# Patient Record
Sex: FEMALE | Race: WHITE | Employment: UNEMPLOYED | URBAN - METROPOLITAN AREA
[De-identification: names, ages, dates, MRNs, and addresses within clinical notes are randomized per-mention and may not be internally consistent; named-entity substitution may affect disease eponyms.]

---

## 2019-02-28 ENCOUNTER — HOSPITAL ENCOUNTER (OUTPATIENT)
Dept: ULTRASOUND IMAGING | Facility: HOSPITAL | Age: 24
Discharge: HOME | End: 2019-02-28
Attending: OBSTETRICS & GYNECOLOGY
Payer: MEDICAID

## 2019-02-28 ENCOUNTER — OFFICE VISIT (OUTPATIENT)
Dept: OBGYN CLINIC | Facility: CLINIC | Age: 24
End: 2019-02-28
Payer: MEDICAID

## 2019-02-28 VITALS
WEIGHT: 165 LBS | BODY MASS INDEX: 34.63 KG/M2 | DIASTOLIC BLOOD PRESSURE: 62 MMHG | HEIGHT: 58 IN | SYSTOLIC BLOOD PRESSURE: 104 MMHG

## 2019-02-28 DIAGNOSIS — N92.6 MISSED MENSES: ICD-10-CM

## 2019-02-28 DIAGNOSIS — N92.6 MISSED MENSES: Primary | ICD-10-CM

## 2019-02-28 PROBLEM — Z98.891 H/O CESAREAN SECTION: Status: ACTIVE | Noted: 2019-02-28

## 2019-02-28 LAB
CONTROL LINE PRESENT WITH A CLEAR BACKGROUND (YES/NO): YES YES/NO
KIT LOT #: NORMAL NUMERIC

## 2019-02-28 PROCEDURE — 99215 OFFICE O/P EST HI 40 MIN: CPT | Performed by: OBSTETRICS & GYNECOLOGY

## 2019-02-28 PROCEDURE — 81025 URINE PREGNANCY TEST: CPT | Performed by: OBSTETRICS & GYNECOLOGY

## 2019-02-28 PROCEDURE — 76801 OB US < 14 WKS SINGLE FETUS: CPT | Performed by: OBSTETRICS & GYNECOLOGY

## 2019-02-28 RX ORDER — PNV 119/IRON FUM/FOLIC ACID 29 MG-1 MG
TABLET ORAL
COMMUNITY
Start: 2013-09-26

## 2019-02-28 NOTE — PROGRESS NOTES
HPI:    Patient ID: Telford Bernheim is a 26year old female. Patient here for PCV. Discussed PNC and PNV. Optional and required screening discussed. Patient with H/O previous  and thinking about VTOL.   Patient was in ER at another hospital abo

## 2019-03-07 ENCOUNTER — LAB ENCOUNTER (OUTPATIENT)
Dept: LAB | Facility: HOSPITAL | Age: 24
End: 2019-03-07
Attending: OBSTETRICS & GYNECOLOGY
Payer: MEDICAID

## 2019-03-07 ENCOUNTER — NURSE ONLY (OUTPATIENT)
Dept: OBGYN CLINIC | Facility: CLINIC | Age: 24
End: 2019-03-07
Payer: MEDICAID

## 2019-03-07 DIAGNOSIS — Z34.81 ENCOUNTER FOR SUPERVISION OF OTHER NORMAL PREGNANCY IN FIRST TRIMESTER: ICD-10-CM

## 2019-03-07 DIAGNOSIS — Z34.81 ENCOUNTER FOR SUPERVISION OF OTHER NORMAL PREGNANCY IN FIRST TRIMESTER: Primary | ICD-10-CM

## 2019-03-07 LAB
ANTIBODY SCREEN: NEGATIVE
BACTERIA UR QL AUTO: NEGATIVE /HPF
BASOPHILS # BLD AUTO: 0.01 X10(3) UL (ref 0–0.2)
BASOPHILS NFR BLD AUTO: 0.1 %
BILIRUB UR QL: NEGATIVE
CLARITY UR: CLEAR
COLOR UR: YELLOW
DEPRECATED RDW RBC AUTO: 42.9 FL (ref 35.1–46.3)
EOSINOPHIL # BLD AUTO: 0.11 X10(3) UL (ref 0–0.7)
EOSINOPHIL NFR BLD AUTO: 1.1 %
ERYTHROCYTE [DISTWIDTH] IN BLOOD BY AUTOMATED COUNT: 17.6 % (ref 11–15)
GLUCOSE 1H P GLC SERPL-MCNC: 128 MG/DL
GLUCOSE UR-MCNC: NEGATIVE MG/DL
HCT VFR BLD AUTO: 38.7 % (ref 35–48)
HGB BLD-MCNC: 13.2 G/DL (ref 12–16)
HGB UR QL STRIP.AUTO: NEGATIVE
IMM GRANULOCYTES # BLD AUTO: 0.04 X10(3) UL (ref 0–1)
IMM GRANULOCYTES NFR BLD: 0.4 %
KETONES UR-MCNC: NEGATIVE MG/DL
LEUKOCYTE ESTERASE UR QL STRIP.AUTO: NEGATIVE
LYMPHOCYTES # BLD AUTO: 1.7 X10(3) UL (ref 1–4)
LYMPHOCYTES NFR BLD AUTO: 16.6 %
MCH RBC QN AUTO: 28.7 PG (ref 26–34)
MCHC RBC AUTO-ENTMCNC: 34.1 G/DL (ref 31–37)
MCV RBC AUTO: 84.1 FL (ref 80–100)
MONOCYTES # BLD AUTO: 0.45 X10(3) UL (ref 0.1–1)
MONOCYTES NFR BLD AUTO: 4.4 %
NEUTROPHILS # BLD AUTO: 7.92 X10 (3) UL (ref 1.5–7.7)
NEUTROPHILS # BLD AUTO: 7.92 X10(3) UL (ref 1.5–7.7)
NEUTROPHILS NFR BLD AUTO: 77.4 %
NITRITE UR QL STRIP.AUTO: NEGATIVE
PH UR: 7 [PH] (ref 5–8)
PLATELET # BLD AUTO: 265 10(3)UL (ref 150–450)
PROT UR-MCNC: NEGATIVE MG/DL
RBC # BLD AUTO: 4.6 X10(6)UL (ref 3.8–5.3)
RBC #/AREA URNS AUTO: 1 /HPF
RH BLOOD TYPE: POSITIVE
SP GR UR STRIP: 1.02 (ref 1–1.03)
UROBILINOGEN UR STRIP-ACNC: <2
VIT C UR-MCNC: NEGATIVE MG/DL
WBC # BLD AUTO: 10.2 X10(3) UL (ref 4–11)
WBC #/AREA URNS AUTO: <1 /HPF

## 2019-03-07 PROCEDURE — 81003 URINALYSIS AUTO W/O SCOPE: CPT

## 2019-03-07 PROCEDURE — 87389 HIV-1 AG W/HIV-1&-2 AB AG IA: CPT

## 2019-03-07 PROCEDURE — 87340 HEPATITIS B SURFACE AG IA: CPT

## 2019-03-07 PROCEDURE — 87086 URINE CULTURE/COLONY COUNT: CPT

## 2019-03-07 PROCEDURE — 36415 COLL VENOUS BLD VENIPUNCTURE: CPT

## 2019-03-07 PROCEDURE — 87147 CULTURE TYPE IMMUNOLOGIC: CPT

## 2019-03-07 PROCEDURE — 99211 OFF/OP EST MAY X REQ PHY/QHP: CPT | Performed by: OBSTETRICS & GYNECOLOGY

## 2019-03-07 PROCEDURE — 85025 COMPLETE CBC W/AUTO DIFF WBC: CPT

## 2019-03-07 PROCEDURE — 82950 GLUCOSE TEST: CPT

## 2019-03-07 PROCEDURE — 86900 BLOOD TYPING SEROLOGIC ABO: CPT

## 2019-03-07 PROCEDURE — 86850 RBC ANTIBODY SCREEN: CPT

## 2019-03-07 PROCEDURE — 86762 RUBELLA ANTIBODY: CPT

## 2019-03-07 PROCEDURE — 86780 TREPONEMA PALLIDUM: CPT

## 2019-03-07 PROCEDURE — 86901 BLOOD TYPING SEROLOGIC RH(D): CPT

## 2019-03-08 LAB
HBV SURFACE AG SER-ACNC: <0.1 [IU]/L
HBV SURFACE AG SERPL QL IA: NONREACTIVE
RUBV IGG SER QL: POSITIVE
RUBV IGG SER-ACNC: 70 IU/ML (ref 10–?)
T PALLIDUM AB SER QL: NEGATIVE

## 2019-03-11 ENCOUNTER — TELEPHONE (OUTPATIENT)
Dept: OBGYN CLINIC | Facility: CLINIC | Age: 24
End: 2019-03-11

## 2019-03-11 DIAGNOSIS — B95.1 POSITIVE GBS TEST: Primary | ICD-10-CM

## 2019-03-11 PROBLEM — R82.71 GBS BACTERIURIA: Status: ACTIVE | Noted: 2019-03-11

## 2019-03-11 RX ORDER — NITROFURANTOIN 25; 75 MG/1; MG/1
100 CAPSULE ORAL 2 TIMES DAILY
Qty: 14 CAPSULE | Refills: 0 | Status: SHIPPED | OUTPATIENT
Start: 2019-03-11 | End: 2019-03-18

## 2019-03-11 NOTE — TELEPHONE ENCOUNTER
Informed pt of message below. Pt voices understanding to this information. Alvaro Resendez 103 entered into Epic. Gbs in urine added to problem--    --- Message from Kvng Moeller MD sent at 3/8/2019  3:40 PM CST -----  Urine Culture is + for GBBS.   Patient is al

## 2019-03-18 ENCOUNTER — ROUTINE PRENATAL (OUTPATIENT)
Dept: OBGYN CLINIC | Facility: CLINIC | Age: 24
End: 2019-03-18
Payer: MEDICAID

## 2019-03-18 ENCOUNTER — TELEPHONE (OUTPATIENT)
Dept: OBGYN CLINIC | Facility: CLINIC | Age: 24
End: 2019-03-18

## 2019-03-18 VITALS
DIASTOLIC BLOOD PRESSURE: 88 MMHG | BODY MASS INDEX: 34 KG/M2 | HEART RATE: 121 BPM | SYSTOLIC BLOOD PRESSURE: 131 MMHG | WEIGHT: 164.81 LBS

## 2019-03-18 DIAGNOSIS — Z34.82 ENCOUNTER FOR SUPERVISION OF OTHER NORMAL PREGNANCY IN SECOND TRIMESTER: Primary | ICD-10-CM

## 2019-03-18 DIAGNOSIS — N89.8 VAGINAL DISCHARGE: ICD-10-CM

## 2019-03-18 DIAGNOSIS — R00.0 TACHYCARDIA: ICD-10-CM

## 2019-03-18 PROBLEM — Z34.90 SUPERVISION OF NORMAL PREGNANCY: Status: ACTIVE | Noted: 2019-03-18

## 2019-03-18 PROBLEM — Z34.90 SUPERVISION OF NORMAL PREGNANCY (HCC): Status: ACTIVE | Noted: 2019-03-18

## 2019-03-18 LAB
APPEARANCE: CLEAR
MULTISTIX LOT#: NORMAL NUMERIC
PH, URINE: 6 (ref 4.5–8)
SPECIFIC GRAVITY: 1.02 (ref 1–1.03)
URINE-COLOR: YELLOW
UROBILINOGEN,SEMI-QN: 0.2 MG/DL (ref 0–1.9)

## 2019-03-18 PROCEDURE — 81002 URINALYSIS NONAUTO W/O SCOPE: CPT | Performed by: OBSTETRICS & GYNECOLOGY

## 2019-03-18 PROCEDURE — 0500F INITIAL PRENATAL CARE VISIT: CPT | Performed by: OBSTETRICS & GYNECOLOGY

## 2019-03-18 NOTE — PROGRESS NOTES
No complaints. Denies cramping or vaginal bleeding. Interested in first trimester screen but has not checked with her \"insurance\". Counseled that she can have a second trimester quad screen. Has GBS bacteriuria.   Is leaning towards a repeat

## 2019-03-19 ENCOUNTER — APPOINTMENT (OUTPATIENT)
Dept: LAB | Age: 24
End: 2019-03-19
Attending: PHYSICIAN ASSISTANT
Payer: MEDICAID

## 2019-03-19 ENCOUNTER — OFFICE VISIT (OUTPATIENT)
Dept: FAMILY MEDICINE CLINIC | Facility: CLINIC | Age: 24
End: 2019-03-19
Payer: MEDICAID

## 2019-03-19 VITALS
TEMPERATURE: 98 F | RESPIRATION RATE: 20 BRPM | WEIGHT: 164.13 LBS | HEIGHT: 58 IN | BODY MASS INDEX: 34.45 KG/M2 | SYSTOLIC BLOOD PRESSURE: 133 MMHG | DIASTOLIC BLOOD PRESSURE: 85 MMHG | HEART RATE: 120 BPM

## 2019-03-19 DIAGNOSIS — R00.0 TACHYCARDIA: ICD-10-CM

## 2019-03-19 DIAGNOSIS — R00.0 TACHYCARDIA: Primary | ICD-10-CM

## 2019-03-19 LAB
ANION GAP SERPL CALC-SCNC: 9 MMOL/L (ref 0–18)
BUN BLD-MCNC: 4 MG/DL (ref 7–18)
BUN/CREAT SERPL: 6.9 (ref 10–20)
C TRACH DNA SPEC QL NAA+PROBE: NEGATIVE
CALCIUM BLD-MCNC: 9 MG/DL (ref 8.5–10.1)
CHLORIDE SERPL-SCNC: 108 MMOL/L (ref 98–107)
CO2 SERPL-SCNC: 21 MMOL/L (ref 21–32)
CREAT BLD-MCNC: 0.58 MG/DL (ref 0.55–1.02)
GLUCOSE BLD-MCNC: 115 MG/DL (ref 70–99)
N GONORRHOEA DNA SPEC QL NAA+PROBE: NEGATIVE
OSMOLALITY SERPL CALC.SUM OF ELEC: 284 MOSM/KG (ref 275–295)
POTASSIUM SERPL-SCNC: 3.6 MMOL/L (ref 3.5–5.1)
SODIUM SERPL-SCNC: 138 MMOL/L (ref 136–145)
TSI SER-ACNC: 1.71 MIU/ML (ref 0.36–3.74)

## 2019-03-19 PROCEDURE — 99202 OFFICE O/P NEW SF 15 MIN: CPT | Performed by: FAMILY MEDICINE

## 2019-03-19 PROCEDURE — 99212 OFFICE O/P EST SF 10 MIN: CPT | Performed by: FAMILY MEDICINE

## 2019-03-19 PROCEDURE — 80048 BASIC METABOLIC PNL TOTAL CA: CPT

## 2019-03-19 PROCEDURE — 93010 ELECTROCARDIOGRAM REPORT: CPT | Performed by: PHYSICIAN ASSISTANT

## 2019-03-19 PROCEDURE — 93005 ELECTROCARDIOGRAM TRACING: CPT

## 2019-03-19 PROCEDURE — 36415 COLL VENOUS BLD VENIPUNCTURE: CPT

## 2019-03-19 PROCEDURE — 84443 ASSAY THYROID STIM HORMONE: CPT

## 2019-03-19 NOTE — PROGRESS NOTES
HPI:     HPI  26year-old pregnant female is here in the office due to elevated heart rate since yesterday at [facility] visit. Patient states that she feels dizziness sometimes when she wakes up. Patient admits that she does not drink enough fluids.  Patient denie file        Minutes per session: Not on file      Stress: Not on file    Relationships      Social connections:        Talks on phone: Not on file        Gets together: Not on file        Attends Worship service: Not on file        Active member of club tenderness. Neurological: She is alert and oriented to person, place, and time. She has normal reflexes. Skin: Skin is intact. No rash noted. Psychiatric: She has a normal mood and affect. HR recheck : 120.     Assessment and Plan[de-identified]     Problem List

## 2019-03-21 LAB
GENITAL VAGINOSIS SCREEN: NEGATIVE
TRICHOMONAS SCREEN: NEGATIVE

## 2019-03-22 ENCOUNTER — TELEPHONE (OUTPATIENT)
Dept: FAMILY MEDICINE CLINIC | Facility: CLINIC | Age: 24
End: 2019-03-22

## 2019-03-22 ENCOUNTER — OFFICE VISIT (OUTPATIENT)
Dept: FAMILY MEDICINE CLINIC | Facility: CLINIC | Age: 24
End: 2019-03-22
Payer: MEDICAID

## 2019-03-22 VITALS
SYSTOLIC BLOOD PRESSURE: 116 MMHG | HEART RATE: 116 BPM | DIASTOLIC BLOOD PRESSURE: 74 MMHG | BODY MASS INDEX: 34 KG/M2 | WEIGHT: 163 LBS

## 2019-03-22 DIAGNOSIS — R00.0 TACHYCARDIA: Primary | ICD-10-CM

## 2019-03-22 PROCEDURE — 99213 OFFICE O/P EST LOW 20 MIN: CPT | Performed by: PHYSICIAN ASSISTANT

## 2019-03-22 PROCEDURE — 99212 OFFICE O/P EST SF 10 MIN: CPT | Performed by: PHYSICIAN ASSISTANT

## 2019-03-22 NOTE — TELEPHONE ENCOUNTER
Pt will receive call from [address] [place] [address] cardiology Monday or Tuesday to schedule appt.  Progress note and ekg have already been faxed to [phone]

## 2019-03-23 ENCOUNTER — TELEPHONE (OUTPATIENT)
Dept: OBGYN CLINIC | Facility: CLINIC | Age: 24
End: 2019-03-23

## 2019-03-23 NOTE — ASSESSMENT & PLAN NOTE
Discussed lab results and EKG with patient. Order: Echocardiogram. Refer to Cardiologist for evaluation.

## 2019-03-23 NOTE — TELEPHONE ENCOUNTER
----- Message from Marilou Brunner, MD sent at 3/22/2019 12:53 PM CDT -----  Please inform patient that the cells on her Pap test were abnormal.  Recommend colposcopy, a closer examination of the cervix with magnification without biopsy as she is pregnant.

## 2019-03-23 NOTE — PROGRESS NOTES
HPI:     HPI  26year-old female is here for lab results and follow-up tachycardia. Patient states that she feels dizziness sometimes. Patient denies of chest pain, SOB, N/V/C/D, fever, syncope. There are no other concerns today.     Medications:       Curr phone: Not on file        Gets together: Not on file        Attends Anabaptist service: Not on file        Active member of club or organization: Not on file        Attends meetings of clubs or organizations: Not on file        Relationship status: Not on f Assessment and Plan[de-identified]     Problem List Items Addressed This Visit        Cardiovascular    Tachycardia - Primary     Discussed lab results and EKG with patient. Order: Echocardiogram. Refer to Cardiologist for evaluation.          Relevant Orders    C

## 2019-03-23 NOTE — TELEPHONE ENCOUNTER
Informed pt of message below- Pt voices understanding and appointment scheduled for 04/03/19 at 2:20 pm with ajb.      ----- Message from Bernadette Feliciano MD sent at 3/22/2019 12:53 PM CDT -----  Please inform patient that the cells on her Pap test were ab

## 2019-03-25 NOTE — TELEPHONE ENCOUNTER
S/w Wang Moura she thought she had an appointment for 4/1 with Dr. Rosas Oreilly at [facility]. Advised they called us to schedule her here. scheduled appt w/Dr. Rosas Oreilly for 4/8/19 at [address] [facility] w/[facility] phone room that there is no appt.  Scheduled at [facility] on

## 2019-03-25 NOTE — TELEPHONE ENCOUNTER
Patient advised of notes below. Patient has an appointment scheduled with cardiologist at [id number] [place] [address] on 4/1/19. Need labs, EKG faxed to [phone].

## 2019-03-25 NOTE — TELEPHONE ENCOUNTER
Pt Called stating someone called from the number giving directions to call cardiology at [facility]. Called to verify with [facility] and per [facility] they need to call and schedule with cardiologist here at the clinic.  Per Ekaterina Wilcox RN at [place] will call pt to prasanthu

## 2019-03-26 NOTE — TELEPHONE ENCOUNTER
To clarify my earlier note, Jus Lobato has an appointment on [facility] on 4/8/19. We have acces to [facility], no need to fax.

## 2019-03-29 ENCOUNTER — HOSPITAL ENCOUNTER (EMERGENCY)
Facility: HOSPITAL | Age: 24
Discharge: HOME | End: 2019-03-30
Attending: EMERGENCY MEDICINE
Payer: MEDICAID

## 2019-03-29 ENCOUNTER — TELEPHONE (OUTPATIENT)
Dept: OBGYN CLINIC | Facility: CLINIC | Age: 24
End: 2019-03-29

## 2019-03-29 ENCOUNTER — APPOINTMENT (OUTPATIENT)
Dept: GENERAL RADIOLOGY | Facility: HOSPITAL | Age: 24
End: 2019-03-29
Attending: EMERGENCY MEDICINE
Payer: MEDICAID

## 2019-03-29 DIAGNOSIS — S93.401A MILD SPRAIN OF RIGHT ANKLE, INITIAL ENCOUNTER: Primary | ICD-10-CM

## 2019-03-29 PROCEDURE — 80048 BASIC METABOLIC PNL TOTAL CA: CPT | Performed by: EMERGENCY MEDICINE

## 2019-03-29 PROCEDURE — 85379 FIBRIN DEGRADATION QUANT: CPT

## 2019-03-29 PROCEDURE — 73610 X-RAY EXAM OF ANKLE: CPT | Performed by: EMERGENCY MEDICINE

## 2019-03-29 PROCEDURE — 93010 ELECTROCARDIOGRAM REPORT: CPT | Performed by: EMERGENCY MEDICINE

## 2019-03-29 PROCEDURE — 85379 FIBRIN DEGRADATION QUANT: CPT | Performed by: EMERGENCY MEDICINE

## 2019-03-29 PROCEDURE — 84484 ASSAY OF TROPONIN QUANT: CPT

## 2019-03-29 PROCEDURE — 84484 ASSAY OF TROPONIN QUANT: CPT | Performed by: EMERGENCY MEDICINE

## 2019-03-29 PROCEDURE — 36415 COLL VENOUS BLD VENIPUNCTURE: CPT

## 2019-03-29 PROCEDURE — 99285 EMERGENCY DEPT VISIT HI MDM: CPT

## 2019-03-29 PROCEDURE — 80048 BASIC METABOLIC PNL TOTAL CA: CPT

## 2019-03-29 PROCEDURE — 93005 ELECTROCARDIOGRAM TRACING: CPT

## 2019-03-29 PROCEDURE — 85025 COMPLETE CBC W/AUTO DIFF WBC: CPT

## 2019-03-29 PROCEDURE — 85025 COMPLETE CBC W/AUTO DIFF WBC: CPT | Performed by: EMERGENCY MEDICINE

## 2019-03-29 RX ORDER — ACETAMINOPHEN 500 MG
1000 TABLET ORAL ONCE
Status: COMPLETED | OUTPATIENT
Start: 2019-03-29 | End: 2019-03-29

## 2019-03-29 NOTE — TELEPHONE ENCOUNTER
Per pt she slipped and sprained her ankle. Pt wondering if safe to be taking tylenol extra strength every 4 hrs.  Please advise pt is 14w1d

## 2019-03-29 NOTE — TELEPHONE ENCOUNTER
Pt 14w1d reported she took a wrong step when she twisted her ankle. Asked how much Tylenol she can take. Pt advised per office Ob med guidelines she can take 500 mg-1,00mg of Tylenol. Not to exceed 3,000mg in 24 hours. Advised to go to urgent care to have her ankle looked at. Pt verbalized understanding.

## 2019-03-30 ENCOUNTER — APPOINTMENT (OUTPATIENT)
Dept: CT IMAGING | Facility: HOSPITAL | Age: 24
End: 2019-03-30
Attending: EMERGENCY MEDICINE
Payer: MEDICAID

## 2019-03-30 VITALS
TEMPERATURE: 98 F | DIASTOLIC BLOOD PRESSURE: 79 MMHG | HEART RATE: 125 BPM | RESPIRATION RATE: 16 BRPM | SYSTOLIC BLOOD PRESSURE: 115 MMHG | HEIGHT: 58 IN | WEIGHT: 160 LBS | OXYGEN SATURATION: 100 % | BODY MASS INDEX: 33.58 KG/M2

## 2019-03-30 PROCEDURE — 71260 CT THORAX DX C+: CPT | Performed by: EMERGENCY MEDICINE

## 2019-03-30 PROCEDURE — 81003 URINALYSIS AUTO W/O SCOPE: CPT | Performed by: EMERGENCY MEDICINE

## 2019-03-30 NOTE — ED PROVIDER NOTES
Patient Seen in: [facility] AND [facility] Emergency Department    History   Patient presents with:  Lower Extremity Injury (musculoskeletal)  Chest Pain Angina (cardiovascular)    Stated Complaint: Swollen ankle; chest pain    HPI    59-year-old female now G2 P Exam    Constitutional: Oriented to person, place, and time. Appears well-developed. No distress. Head: Normocephalic and atraumatic. Eyes: Conjunctivae are normal. Pupils are equal, round, and reactive to light. Neck: Normal range of motion.  Neck s Status                     ---------                               -----------         ------                     CBC W/ DIFFERENTIAL[[id number]]          Abnormal            Final result                 Please view results for these tests on the in [facility]erica Humphrey, [address], [address]  [id number] [facility] [id number]    Call in 2 days      YOUR OB DOCTOR'S OFFICE    Call in 2 days          Medications Prescribed:  Current Discharge Medication List

## 2019-03-30 NOTE — ED INITIAL ASSESSMENT (HPI)
Pt c/o right ankle pain and swelling started 24 hours ago after she miss a step and twisted her ankle, pt also c/o chest pain, denies nausea/vomiting/SOB, pt sts that she is 13 weeks pregnant

## 2019-04-03 ENCOUNTER — ROUTINE PRENATAL (OUTPATIENT)
Dept: OBGYN CLINIC | Facility: CLINIC | Age: 24
End: 2019-04-03
Payer: MEDICAID

## 2019-04-03 ENCOUNTER — APPOINTMENT (OUTPATIENT)
Dept: LAB | Facility: HOSPITAL | Age: 24
End: 2019-04-03
Attending: OBSTETRICS & GYNECOLOGY
Payer: MEDICAID

## 2019-04-03 ENCOUNTER — OFFICE VISIT (OUTPATIENT)
Dept: OBGYN CLINIC | Facility: CLINIC | Age: 24
End: 2019-04-03
Payer: MEDICAID

## 2019-04-03 VITALS — BODY MASS INDEX: 34 KG/M2 | WEIGHT: 164 LBS

## 2019-04-03 VITALS
HEART RATE: 106 BPM | DIASTOLIC BLOOD PRESSURE: 80 MMHG | BODY MASS INDEX: 34 KG/M2 | WEIGHT: 164 LBS | SYSTOLIC BLOOD PRESSURE: 131 MMHG

## 2019-04-03 DIAGNOSIS — Z34.81 ENCOUNTER FOR SUPERVISION OF OTHER NORMAL PREGNANCY IN FIRST TRIMESTER: Primary | ICD-10-CM

## 2019-04-03 DIAGNOSIS — R87.612 PAPANICOLAOU SMEAR OF CERVIX WITH LOW GRADE SQUAMOUS INTRAEPITHELIAL LESION (LGSIL): Primary | ICD-10-CM

## 2019-04-03 DIAGNOSIS — O36.8390 MATERNAL CARE FOR FETAL TACHYCARDIA DURING PREGNANCY: ICD-10-CM

## 2019-04-03 PROBLEM — T81.40XD POSTOPERATIVE INFECTION, SUBSEQUENT ENCOUNTER: Status: ACTIVE | Noted: 2019-04-03

## 2019-04-03 PROBLEM — N89.8 VAGINAL DISCHARGE: Status: RESOLVED | Noted: 2019-03-18 | Resolved: 2019-04-03

## 2019-04-03 PROBLEM — R03.0 ELEVATED BP WITHOUT DIAGNOSIS OF HYPERTENSION: Status: ACTIVE | Noted: 2019-04-03

## 2019-04-03 PROCEDURE — 36415 COLL VENOUS BLD VENIPUNCTURE: CPT

## 2019-04-03 PROCEDURE — 80053 COMPREHEN METABOLIC PANEL: CPT

## 2019-04-03 PROCEDURE — 0502F SUBSEQUENT PRENATAL CARE: CPT | Performed by: OBSTETRICS & GYNECOLOGY

## 2019-04-03 PROCEDURE — 57420 EXAM OF VAGINA W/SCOPE: CPT | Performed by: OBSTETRICS & GYNECOLOGY

## 2019-04-03 PROCEDURE — 81002 URINALYSIS NONAUTO W/O SCOPE: CPT | Performed by: OBSTETRICS & GYNECOLOGY

## 2019-04-03 NOTE — PROGRESS NOTES
Patient saw general medicine for initial evaluation for maternal tachycardia. Thyroid screen and EKG negative. Has a cardiology consult this Monday. Patient notes at times her heart racing. She denies shortness of breath or chest pain.   She twisted her

## 2019-04-03 NOTE — PROGRESS NOTES
Patient counseled on abnormal Pap tests. This is her first abnormality. Counseled on findings on her colposcopy. Clinically ROSANNE 1. Plan Pap and colp 6 weeks postpartum.

## 2019-04-05 ENCOUNTER — TELEPHONE (OUTPATIENT)
Dept: OBGYN CLINIC | Facility: CLINIC | Age: 24
End: 2019-04-05

## 2019-04-05 ENCOUNTER — APPOINTMENT (OUTPATIENT)
Dept: LAB | Facility: HOSPITAL | Age: 24
End: 2019-04-05
Attending: OBSTETRICS & GYNECOLOGY
Payer: MEDICAID

## 2019-04-05 DIAGNOSIS — O36.8390 MATERNAL CARE FOR FETAL TACHYCARDIA DURING PREGNANCY: ICD-10-CM

## 2019-04-05 PROCEDURE — 84156 ASSAY OF PROTEIN URINE: CPT

## 2019-04-05 NOTE — TELEPHONE ENCOUNTER
PER PT STATE SHE WAS SUPPOSE TO DROP OFF URINE TODAY / PT STATE SHE CAN'T BECAUSE SHE DO NOT HAVE A RIDE / PT WANT TO KNOW IF SHE COULD DROP IT OFF TOMORROW MORNING / PLS ADV

## 2019-04-05 NOTE — TELEPHONE ENCOUNTER
Called reference lab to verify. Per Yusra Galloway, pt needs to keep it refrigerated and she can drop it off tomorrow. Pt informed and verbalized understanding.

## 2019-04-23 ENCOUNTER — HOSPITAL ENCOUNTER (EMERGENCY)
Facility: HOSPITAL | Age: 24
Discharge: HOME | End: 2019-04-23
Attending: PHYSICIAN ASSISTANT
Payer: MEDICAID

## 2019-04-23 ENCOUNTER — APPOINTMENT (OUTPATIENT)
Dept: ULTRASOUND IMAGING | Facility: HOSPITAL | Age: 24
End: 2019-04-23
Attending: PHYSICIAN ASSISTANT
Payer: MEDICAID

## 2019-04-23 ENCOUNTER — TELEPHONE (OUTPATIENT)
Dept: OBGYN CLINIC | Facility: CLINIC | Age: 24
End: 2019-04-23

## 2019-04-23 VITALS
DIASTOLIC BLOOD PRESSURE: 58 MMHG | BODY MASS INDEX: 33.58 KG/M2 | WEIGHT: 160 LBS | HEIGHT: 58 IN | HEART RATE: 76 BPM | RESPIRATION RATE: 16 BRPM | TEMPERATURE: 98 F | SYSTOLIC BLOOD PRESSURE: 108 MMHG | OXYGEN SATURATION: 97 %

## 2019-04-23 DIAGNOSIS — O21.9 NAUSEA AND VOMITING IN PREGNANCY: Primary | ICD-10-CM

## 2019-04-23 DIAGNOSIS — R42 LIGHTHEADEDNESS: ICD-10-CM

## 2019-04-23 DIAGNOSIS — Z86.2 HISTORY OF ANEMIA: ICD-10-CM

## 2019-04-23 DIAGNOSIS — R19.7 DIARRHEA, UNSPECIFIED TYPE: ICD-10-CM

## 2019-04-23 PROCEDURE — 81025 URINE PREGNANCY TEST: CPT

## 2019-04-23 PROCEDURE — 93005 ELECTROCARDIOGRAM TRACING: CPT

## 2019-04-23 PROCEDURE — 83690 ASSAY OF LIPASE: CPT | Performed by: PHYSICIAN ASSISTANT

## 2019-04-23 PROCEDURE — 96361 HYDRATE IV INFUSION ADD-ON: CPT

## 2019-04-23 PROCEDURE — 96374 THER/PROPH/DIAG INJ IV PUSH: CPT

## 2019-04-23 PROCEDURE — 85025 COMPLETE CBC W/AUTO DIFF WBC: CPT | Performed by: PHYSICIAN ASSISTANT

## 2019-04-23 PROCEDURE — 93010 ELECTROCARDIOGRAM REPORT: CPT | Performed by: PHYSICIAN ASSISTANT

## 2019-04-23 PROCEDURE — S0028 INJECTION, FAMOTIDINE, 20 MG: HCPCS | Performed by: PHYSICIAN ASSISTANT

## 2019-04-23 PROCEDURE — 76705 ECHO EXAM OF ABDOMEN: CPT | Performed by: PHYSICIAN ASSISTANT

## 2019-04-23 PROCEDURE — 76815 OB US LIMITED FETUS(S): CPT | Performed by: PHYSICIAN ASSISTANT

## 2019-04-23 PROCEDURE — 81001 URINALYSIS AUTO W/SCOPE: CPT | Performed by: PHYSICIAN ASSISTANT

## 2019-04-23 PROCEDURE — 99285 EMERGENCY DEPT VISIT HI MDM: CPT

## 2019-04-23 PROCEDURE — 80048 BASIC METABOLIC PNL TOTAL CA: CPT | Performed by: PHYSICIAN ASSISTANT

## 2019-04-23 PROCEDURE — 80076 HEPATIC FUNCTION PANEL: CPT | Performed by: PHYSICIAN ASSISTANT

## 2019-04-23 PROCEDURE — 96375 TX/PRO/DX INJ NEW DRUG ADDON: CPT

## 2019-04-23 RX ORDER — ONDANSETRON 4 MG/1
4 TABLET, ORALLY DISINTEGRATING ORAL EVERY 6 HOURS PRN
Qty: 10 TABLET | Refills: 0 | Status: SHIPPED | OUTPATIENT
Start: 2019-04-23 | End: 2019-04-26

## 2019-04-23 RX ORDER — ONDANSETRON 2 MG/ML
4 INJECTION INTRAMUSCULAR; INTRAVENOUS ONCE
Status: COMPLETED | OUTPATIENT
Start: 2019-04-23 | End: 2019-04-23

## 2019-04-23 RX ORDER — DEXTROSE AND SODIUM CHLORIDE 5; .9 G/100ML; G/100ML
INJECTION, SOLUTION INTRAVENOUS ONCE
Status: COMPLETED | OUTPATIENT
Start: 2019-04-23 | End: 2019-04-23

## 2019-04-23 RX ORDER — FAMOTIDINE 10 MG/ML
20 INJECTION, SOLUTION INTRAVENOUS ONCE
Status: COMPLETED | OUTPATIENT
Start: 2019-04-23 | End: 2019-04-23

## 2019-04-23 NOTE — ED PROVIDER NOTES
Patient Seen in: [facility] AND [facility] Emergency Department    History   Patient presents with:  Nausea/Vomiting/Diarrhea (gastrointestinal)    Stated Complaint: n/v 17 wks preg    HPI      71-year-old female, approximately 17 weeks pregnant presents with ch 1410 110   Resp 04/23/19 1410 16   Temp 04/23/19 1410 97.5 °F (36.4 °C)   Temp src 04/23/19 1410 Oral   SpO2 04/23/19 1410 95 %   O2 Device 04/23/19 1513 None (Room air)       Current:/68   Pulse 98   Temp 97.5 °F (36.4 °C) (Oral)   Resp 17   Ht 147. following components:       Result Value    Glucose 103 (*)     BUN 5 (*)     Creatinine 0.39 (*)     All other components within normal limits   URINALYSIS WITH CULTURE REFLEX - Abnormal; Notable for the following components:    Protein Urine 30  (*) Approved by (CST): Pennie Barajas MD on 4/23/2019 at 16:53          [employer] (RSE=10208)    Result Date: 4/23/2019  CONCLUSION:  1. Single viable appearing intrauterine gestation cephalic presentation. Placenta is anterior, and there is no previa.

## 2019-04-23 NOTE — ED INITIAL ASSESSMENT (HPI)
N/v/d onset last night. Denies abd pain. approx 17 weeks preg. Also states she has been spotting since last night.

## 2019-04-23 NOTE — TELEPHONE ENCOUNTER
OB History     T1    L1    SAB0  TAB1  Ectopic0  Multiple0  Live Births1     Pt is 17w5d and states that the past week she has been light headed. States that in the past three days she has been having some swelling also.  States that she has not

## 2019-05-03 ENCOUNTER — TELEPHONE (OUTPATIENT)
Dept: OBGYN CLINIC | Facility: CLINIC | Age: 24
End: 2019-05-03

## 2019-05-03 DIAGNOSIS — R00.0 TACHYCARDIA, UNSPECIFIED: Primary | ICD-10-CM

## 2019-05-03 DIAGNOSIS — Z34.82 ENCOUNTER FOR SUPERVISION OF OTHER NORMAL PREGNANCY IN SECOND TRIMESTER: ICD-10-CM

## 2019-05-03 NOTE — TELEPHONE ENCOUNTER
PA previously obtained see referral tab. Return call from [facility] clarifying that dx on level 1 indicates fetal tachycardia. RN to change to maternal tachycardia. Order updated to indicate change.

## 2019-05-03 NOTE — TELEPHONE ENCOUNTER
A level 1 u/s should be sufficient as the tachycardia is maternal not fetal.  She should have MFM consultation for maternal tachycardia.

## 2019-05-03 NOTE — TELEPHONE ENCOUNTER
RN placed call to [facility], advised Magnus Zambrano RN of B response. Magnus Zambrano verbalized understanding. Will advise [facility] staff.

## 2019-05-03 NOTE — TELEPHONE ENCOUNTER
Call received from [facility] RN asking that order for anatomy scan is changed to Level 2 due to tachycardia. RN routing to provider for consideration. Pt has [address] and will need prior auth.     RN routing to provider for consideration

## 2019-05-08 ENCOUNTER — ROUTINE PRENATAL (OUTPATIENT)
Dept: OBGYN CLINIC | Facility: CLINIC | Age: 24
End: 2019-05-08
Payer: MEDICAID

## 2019-05-08 VITALS — DIASTOLIC BLOOD PRESSURE: 77 MMHG | WEIGHT: 162 LBS | SYSTOLIC BLOOD PRESSURE: 112 MMHG | BODY MASS INDEX: 34 KG/M2

## 2019-05-08 DIAGNOSIS — Z34.02 ENCOUNTER FOR SUPERVISION OF NORMAL FIRST PREGNANCY IN SECOND TRIMESTER: Primary | ICD-10-CM

## 2019-05-08 PROCEDURE — 0502F SUBSEQUENT PRENATAL CARE: CPT | Performed by: OBSTETRICS & GYNECOLOGY

## 2019-05-08 PROCEDURE — 81002 URINALYSIS NONAUTO W/O SCOPE: CPT | Performed by: OBSTETRICS & GYNECOLOGY

## 2019-05-08 NOTE — PROGRESS NOTES
Pt has appt for ob2 u/s 1 week. Good fm noted. Pt stated she has had some joint discomfort rickie her knees prior to pregnancy, pt to call her pcp for this.

## 2019-05-09 ENCOUNTER — TELEPHONE (OUTPATIENT)
Dept: OBGYN CLINIC | Facility: CLINIC | Age: 24
End: 2019-05-09

## 2019-05-09 DIAGNOSIS — R00.0 TACHYCARDIA: Primary | ICD-10-CM

## 2019-05-09 NOTE — TELEPHONE ENCOUNTER
Jose R Aldana from [facility] called to get a level 2 for pt per Dr. Maritza Barbosa pt needs a level 2 for maternal tachycardia. I changed the order and will be calling [employer] for prior auth.

## 2019-05-09 NOTE — TELEPHONE ENCOUNTER
Deirdre from [facility] called to change orders to level 2. I did let her know about your previous note stating a level 1 would be sufficient for maternal tachycardia but Martínez Handy stated Dr. Rizwana Brown spoke to pt and pt wants a level 2. Pls advise.

## 2019-05-12 NOTE — PROGRESS NOTES
Outpatient Maternal-Fetal Medicine Consultation    Dear Dr. Pritesh Morris    Thank you for requesting ultrasound evaluation and maternal fetal medicine consultation on your patient Taryn Link.   As you are aware she is a 26year old female  with a survey. See PACS/Imaging Tab For Complete Ultrasound Report  I interpreted the results and reviewed them with the patient.     DISCUSSION  During her visit we discussed and reviewed the following issues:  [facility]  Cardiology workup essential was spent in review of records, consultation and coordination of care. Our discussion is summarized above. The approximate physician face-to-face time was 40 minutes.

## 2019-05-13 ENCOUNTER — HOSPITAL ENCOUNTER (OUTPATIENT)
Dept: PERINATAL CARE | Facility: HOSPITAL | Age: 24
Discharge: HOME | End: 2019-05-13
Attending: OBSTETRICS & GYNECOLOGY
Payer: MEDICAID

## 2019-05-13 VITALS
SYSTOLIC BLOOD PRESSURE: 126 MMHG | DIASTOLIC BLOOD PRESSURE: 72 MMHG | WEIGHT: 162 LBS | HEIGHT: 58 IN | HEART RATE: 114 BPM | BODY MASS INDEX: 34 KG/M2

## 2019-05-13 DIAGNOSIS — Z36.3 ENCOUNTER FOR ANTENATAL SCREENING FOR MALFORMATION USING ULTRASOUND: Primary | ICD-10-CM

## 2019-05-13 DIAGNOSIS — O36.8390 MATERNAL CARE FOR FETAL TACHYCARDIA DURING PREGNANCY: ICD-10-CM

## 2019-05-13 DIAGNOSIS — Z36.3 ENCOUNTER FOR ANTENATAL SCREENING FOR MALFORMATION USING ULTRASOUND: ICD-10-CM

## 2019-05-13 PROCEDURE — 99218 INITIAL OBSERVATION CARE,LEVL I: CPT | Performed by: OBSTETRICS & GYNECOLOGY

## 2019-05-13 PROCEDURE — 76811 OB US DETAILED SNGL FETUS: CPT | Performed by: OBSTETRICS & GYNECOLOGY

## 2019-05-20 ENCOUNTER — TELEPHONE (OUTPATIENT)
Dept: PERINATAL CARE | Facility: HOSPITAL | Age: 24
End: 2019-05-20

## 2019-05-23 ENCOUNTER — OFFICE VISIT (OUTPATIENT)
Dept: CARDIOLOGY CLINIC | Facility: CLINIC | Age: 24
End: 2019-05-23
Payer: MEDICAID

## 2019-05-23 VITALS
HEART RATE: 104 BPM | WEIGHT: 163 LBS | OXYGEN SATURATION: 94 % | DIASTOLIC BLOOD PRESSURE: 71 MMHG | SYSTOLIC BLOOD PRESSURE: 107 MMHG | RESPIRATION RATE: 22 BRPM | BODY MASS INDEX: 34 KG/M2

## 2019-05-23 DIAGNOSIS — R00.0 SINUS TACHYCARDIA: Primary | ICD-10-CM

## 2019-05-23 PROCEDURE — 99245 OFF/OP CONSLTJ NEW/EST HI 55: CPT | Performed by: INTERNAL MEDICINE

## 2019-05-23 PROCEDURE — 99212 OFFICE O/P EST SF 10 MIN: CPT | Performed by: INTERNAL MEDICINE

## 2019-05-23 NOTE — PROGRESS NOTES
[facility], [facility]    Cardiac Electrophysiology Consultation  2019    Name:  Merari Harrisbalbir  : 1995    Date of consultation:   2019    Referring physician: Dr. Catherine Hunt (Fetal-Maternal Medicine)    Reason for Consultation:  tach exertion  CARDIOVASCULAR: no chest pain  GI: denies abdominal pain and denies heartburn  : no dysuria or hematuria  NEURO: denies headaches, focal weaknesses or paresthesias    Physical Exam:  Vital Signs: /71 (BP Location: Right arm, Patient Posit 138 04/23/2019    K 3.5 04/23/2019     04/23/2019    CO2 21.0 04/23/2019     Lab Results   Component Value Date    TSH 1.710 03/19/2019       IMPRESSIONS:  1. Sinus tachycardia  (primary encounter diagnosis)   2.  Pregnancy    She has been noted to ha

## 2019-05-24 ENCOUNTER — TELEPHONE (OUTPATIENT)
Dept: CARDIOLOGY CLINIC | Facility: CLINIC | Age: 24
End: 2019-05-24

## 2019-05-24 ENCOUNTER — MED REC SCAN ONLY (OUTPATIENT)
Dept: CARDIOLOGY CLINIC | Facility: CLINIC | Age: 24
End: 2019-05-24

## 2019-05-24 NOTE — TELEPHONE ENCOUNTER
Per [employer] online PA obtained # [id number] good  5/24/19- 7/8/19. For Echo cpt - [id number]. notified Meryle Donning in managed care. notified pt.

## 2019-05-29 ENCOUNTER — MED REC SCAN ONLY (OUTPATIENT)
Dept: INTERNAL MEDICINE CLINIC | Facility: CLINIC | Age: 24
End: 2019-05-29

## 2019-06-04 ENCOUNTER — TELEPHONE (OUTPATIENT)
Dept: OBGYN CLINIC | Facility: CLINIC | Age: 24
End: 2019-06-04

## 2019-06-04 DIAGNOSIS — R00.0 TACHYCARDIA: Primary | ICD-10-CM

## 2019-06-06 NOTE — TELEPHONE ENCOUNTER
Authorization Number: PILAR  Case Number: [id number]  Status: Additional Information Required  Approval Date:   Service Code: [id number]    RN, clinicals needed for U/S above.

## 2019-06-10 ENCOUNTER — ROUTINE PRENATAL (OUTPATIENT)
Dept: OBGYN CLINIC | Facility: CLINIC | Age: 24
End: 2019-06-10
Payer: MEDICAID

## 2019-06-10 VITALS — SYSTOLIC BLOOD PRESSURE: 112 MMHG | WEIGHT: 167 LBS | BODY MASS INDEX: 35 KG/M2 | DIASTOLIC BLOOD PRESSURE: 78 MMHG

## 2019-06-10 DIAGNOSIS — Z34.82 ENCOUNTER FOR SUPERVISION OF OTHER NORMAL PREGNANCY IN SECOND TRIMESTER: Primary | ICD-10-CM

## 2019-06-10 PROCEDURE — 0502F SUBSEQUENT PRENATAL CARE: CPT | Performed by: OBSTETRICS & GYNECOLOGY

## 2019-06-10 PROCEDURE — 81002 URINALYSIS NONAUTO W/O SCOPE: CPT | Performed by: OBSTETRICS & GYNECOLOGY

## 2019-06-10 NOTE — PROGRESS NOTES
Per [facility] note, u/s at [address] for r/o cleft palate. Pt has appt jun 20.

## 2019-06-18 ENCOUNTER — APPOINTMENT (OUTPATIENT)
Dept: ULTRASOUND IMAGING | Facility: HOSPITAL | Age: 24
End: 2019-06-18
Attending: OBSTETRICS & GYNECOLOGY
Payer: MEDICAID

## 2019-06-18 ENCOUNTER — TELEPHONE (OUTPATIENT)
Dept: OBGYN CLINIC | Facility: CLINIC | Age: 24
End: 2019-06-18

## 2019-06-18 ENCOUNTER — HOSPITAL ENCOUNTER (OUTPATIENT)
Facility: HOSPITAL | Age: 24
Setting detail: OBSERVATION
Discharge: HOME | End: 2019-06-19
Attending: OBSTETRICS & GYNECOLOGY | Admitting: OBSTETRICS & GYNECOLOGY
Payer: MEDICAID

## 2019-06-18 ENCOUNTER — HOSPITAL ENCOUNTER (EMERGENCY)
Facility: HOSPITAL | Age: 24
Discharge: HOME | End: 2019-06-18
Attending: PHYSICIAN ASSISTANT
Payer: MEDICAID

## 2019-06-18 ENCOUNTER — APPOINTMENT (OUTPATIENT)
Dept: GENERAL RADIOLOGY | Facility: HOSPITAL | Age: 24
End: 2019-06-18
Attending: PHYSICIAN ASSISTANT
Payer: MEDICAID

## 2019-06-18 VITALS
SYSTOLIC BLOOD PRESSURE: 110 MMHG | RESPIRATION RATE: 20 BRPM | TEMPERATURE: 98 F | DIASTOLIC BLOOD PRESSURE: 68 MMHG | HEART RATE: 117 BPM

## 2019-06-18 VITALS
BODY MASS INDEX: 32.54 KG/M2 | TEMPERATURE: 98 F | DIASTOLIC BLOOD PRESSURE: 75 MMHG | HEIGHT: 58 IN | HEART RATE: 123 BPM | OXYGEN SATURATION: 97 % | SYSTOLIC BLOOD PRESSURE: 123 MMHG | RESPIRATION RATE: 20 BRPM | WEIGHT: 155 LBS
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
## 2019-06-18 DIAGNOSIS — IMO0001: ICD-10-CM

## 2019-06-18 PROBLEM — Z34.90 PREGNANCY: Status: ACTIVE | Noted: 2019-06-18

## 2019-06-18 PROBLEM — Z34.90 PREGNANCY (HCC): Status: ACTIVE | Noted: 2019-06-18

## 2019-06-18 PROCEDURE — 73140 X-RAY EXAM OF FINGER(S): CPT | Performed by: PHYSICIAN ASSISTANT

## 2019-06-18 PROCEDURE — 76815 OB US LIMITED FETUS(S): CPT | Performed by: OBSTETRICS & GYNECOLOGY

## 2019-06-18 PROCEDURE — 99283 EMERGENCY DEPT VISIT LOW MDM: CPT

## 2019-06-18 PROCEDURE — 76811 OB US DETAILED SNGL FETUS: CPT | Performed by: OBSTETRICS & GYNECOLOGY

## 2019-06-18 NOTE — ED INITIAL ASSESSMENT (HPI)
Pt reports slipping in the shower and hitting the right side of her abd. Pt is 25 weeks pregnant . Pt reports that she has felt the baby move since the fall.  Pt denies LOC

## 2019-06-18 NOTE — TELEPHONE ENCOUNTER
Pt 25 weeks c/o falling in the shower about 30 minutes ago. Per pt fell on right side including the side of her right abdomen.  Pt denied cramping pain, vaginal bleeding or leaking fluid, but stated abdomen feels harder than usual and reported feeling short

## 2019-06-18 NOTE — ED NOTES
Patient presents to ER after falling in the shower and landing on her right side. Patient is currently 25 weeks pregnant.  Denies vaginal bleeding or abdominal pain

## 2019-06-19 PROCEDURE — 59025 FETAL NON-STRESS TEST: CPT | Performed by: OBSTETRICS & GYNECOLOGY

## 2019-06-19 NOTE — TRIAGE
[facility]      Triage Note    [address] Patient Status:  Observation    1995 MRN [id number]   Location [address]  Attending Isacc Hackett MD   [facility] Day # 0 PCP Tanya Vaughan [place] NST   Variability: Moderate           Accelerations: Yes           Decelerations: None            Baseline: 145 BPM           Uterine Irritability: No           Contractions: Not present                       Acoustic Stimulator: No           Nonstre
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
## 2019-06-19 NOTE — ED PROVIDER NOTES
Patient Seen in: [facility] AND [facility] Emergency Department    History   Patient presents with:  Fall (musculoskeletal, neurologic)    Stated Complaint:     HPI    28-year-old female, 25 weeks pregnant presents with chief complaint of fall.   Onset 1 hour saida O2 Device None (Room air)       Current:/75   Pulse (!) 123   Temp 98 °F (36.7 °C) (Oral)   Resp 20   Ht 147.3 cm (4' 10\")   Wt 70.3 kg   LMP 12/25/2018   SpO2 97%   BMI 32.40 kg/m²      PULSE OX within normal limits on room air as interpreted by wounds. No compartment syndrome. No edema. Remainder of musculoskeletal system is grossly intact. There is no obvious deformity. Spine nontender to palpation. Neurological: Gross motor movement is intact in all 4 extremities.   Patient exhibits normal health screening including reassessment of your blood pressure.     Medications Prescribed:  Current Discharge Medication List

## 2019-06-24 ENCOUNTER — TELEPHONE (OUTPATIENT)
Dept: PERINATAL CARE | Facility: HOSPITAL | Age: 24
End: 2019-06-24

## 2019-06-24 NOTE — TELEPHONE ENCOUNTER
The patient reported to [facility] for follow-up regarding the potential for a cleft of the soft palate noted on her level 2 ultrasound. Fortunately, the follow-up ultrasound and fetal MRI were reassuring:  There was no evidence of cleft l

## 2019-07-09 ENCOUNTER — ROUTINE PRENATAL (OUTPATIENT)
Dept: OBGYN CLINIC | Facility: CLINIC | Age: 24
End: 2019-07-09
Payer: MEDICAID

## 2019-07-09 VITALS
HEART RATE: 118 BPM | SYSTOLIC BLOOD PRESSURE: 116 MMHG | BODY MASS INDEX: 35 KG/M2 | WEIGHT: 169 LBS | DIASTOLIC BLOOD PRESSURE: 78 MMHG

## 2019-07-09 DIAGNOSIS — O34.219 PREVIOUS CESAREAN DELIVERY, ANTEPARTUM CONDITION OR COMPLICATION: ICD-10-CM

## 2019-07-09 DIAGNOSIS — Z34.83 ENCOUNTER FOR SUPERVISION OF OTHER NORMAL PREGNANCY IN THIRD TRIMESTER: Primary | ICD-10-CM

## 2019-07-09 LAB
MULTISTIX LOT#: NORMAL NUMERIC
PH, URINE: 6 (ref 4.5–8)
SPECIFIC GRAVITY: 1.01 (ref 1–1.03)
URINE-COLOR: YELLOW
UROBILINOGEN,SEMI-QN: 0.2 MG/DL (ref 0–1.9)

## 2019-07-09 PROCEDURE — 81002 URINALYSIS NONAUTO W/O SCOPE: CPT | Performed by: OBSTETRICS & GYNECOLOGY

## 2019-07-09 PROCEDURE — 0502F SUBSEQUENT PRENATAL CARE: CPT | Performed by: OBSTETRICS & GYNECOLOGY

## 2019-07-09 NOTE — PROGRESS NOTES
[facility]  Obstetrics and Gynecology  Prenatal Visit  Wendi Oliver MD    HPI   Julia Hauser is a 26year old.o.  28w5d weeks. Here for routine prenatal visit and is without complaints.   Patient denies any regular uterine contracti given a consent to review and will decide at her next PN visit. Pt to f/u in 2 weeks with Vandana Quiros CNM, for [facility] education and tdap. Joanne Yin MD, MD  2:01 PM  7/9/2019

## 2019-07-18 ENCOUNTER — LAB ENCOUNTER (OUTPATIENT)
Dept: LAB | Age: 24
End: 2019-07-18
Attending: OBSTETRICS & GYNECOLOGY
Payer: MEDICAID

## 2019-07-18 DIAGNOSIS — Z34.82 ENCOUNTER FOR SUPERVISION OF OTHER NORMAL PREGNANCY IN SECOND TRIMESTER: ICD-10-CM

## 2019-07-18 LAB
BASOPHILS # BLD AUTO: 0.02 X10(3) UL (ref 0–0.2)
BASOPHILS NFR BLD AUTO: 0.2 %
DEPRECATED RDW RBC AUTO: 41.5 FL (ref 35.1–46.3)
EOSINOPHIL # BLD AUTO: 0.07 X10(3) UL (ref 0–0.7)
EOSINOPHIL NFR BLD AUTO: 0.8 %
ERYTHROCYTE [DISTWIDTH] IN BLOOD BY AUTOMATED COUNT: 15.2 % (ref 11–15)
GLUCOSE 1H P GLC SERPL-MCNC: 154 MG/DL
HCT VFR BLD AUTO: 33.4 % (ref 35–48)
HGB BLD-MCNC: 10.6 G/DL (ref 12–16)
IMM GRANULOCYTES # BLD AUTO: 0.05 X10(3) UL (ref 0–1)
IMM GRANULOCYTES NFR BLD: 0.5 %
LYMPHOCYTES # BLD AUTO: 1.24 X10(3) UL (ref 1–4)
LYMPHOCYTES NFR BLD AUTO: 13.6 %
MCH RBC QN AUTO: 24.4 PG (ref 26–34)
MCHC RBC AUTO-ENTMCNC: 31.7 G/DL (ref 31–37)
MCV RBC AUTO: 77 FL (ref 80–100)
MONOCYTES # BLD AUTO: 0.38 X10(3) UL (ref 0.1–1)
MONOCYTES NFR BLD AUTO: 4.2 %
NEUTROPHILS # BLD AUTO: 7.39 X10 (3) UL (ref 1.5–7.7)
NEUTROPHILS # BLD AUTO: 7.39 X10(3) UL (ref 1.5–7.7)
NEUTROPHILS NFR BLD AUTO: 80.7 %
PLATELET # BLD AUTO: 254 10(3)UL (ref 150–450)
RBC # BLD AUTO: 4.34 X10(6)UL (ref 3.8–5.3)
WBC # BLD AUTO: 9.2 X10(3) UL (ref 4–11)

## 2019-07-18 PROCEDURE — 36415 COLL VENOUS BLD VENIPUNCTURE: CPT

## 2019-07-18 PROCEDURE — 85025 COMPLETE CBC W/AUTO DIFF WBC: CPT

## 2019-07-18 PROCEDURE — 82950 GLUCOSE TEST: CPT

## 2019-07-22 ENCOUNTER — TELEPHONE (OUTPATIENT)
Dept: OBGYN CLINIC | Facility: CLINIC | Age: 24
End: 2019-07-22

## 2019-07-22 DIAGNOSIS — R73.09 ELEVATED GLUCOSE TOLERANCE TEST: Primary | ICD-10-CM

## 2019-07-22 RX ORDER — FERROUS SULFATE 325(65) MG
325 TABLET ORAL
Qty: 30 TABLET | Refills: 6 | Status: SHIPPED | OUTPATIENT
Start: 2019-07-22

## 2019-07-22 NOTE — TELEPHONE ENCOUNTER
Pt informed regarding 3 hr gtt and need to schedule that. She was also informed on Iron supplement. Pt requesting a prescription be sent to her pharmacy.  Pls advise

## 2019-07-22 NOTE — TELEPHONE ENCOUNTER
----- Message from Karl Hines MD sent at 7/21/2019 11:17 PM CDT -----  Abnormal 1 hr gtt,  Please inform and help pt schedule 3 hr gtt.      Mild anemia noted, please have pt start Iron once daily plus her daily PNV

## 2019-07-23 NOTE — TELEPHONE ENCOUNTER
If possible to send in a prescription then may send in Ferrous Sulfate 325 mg 1 tab po Daily #30 with 6 refills.

## 2019-07-24 ENCOUNTER — ROUTINE PRENATAL (OUTPATIENT)
Dept: OBGYN CLINIC | Facility: CLINIC | Age: 24
End: 2019-07-24
Payer: MEDICAID

## 2019-07-24 ENCOUNTER — LAB ENCOUNTER (OUTPATIENT)
Dept: LAB | Facility: HOSPITAL | Age: 24
End: 2019-07-24
Attending: OBSTETRICS & GYNECOLOGY
Payer: MEDICAID

## 2019-07-24 VITALS — WEIGHT: 170 LBS | DIASTOLIC BLOOD PRESSURE: 76 MMHG | BODY MASS INDEX: 36 KG/M2 | SYSTOLIC BLOOD PRESSURE: 114 MMHG

## 2019-07-24 DIAGNOSIS — R73.09 ELEVATED GLUCOSE TOLERANCE TEST: ICD-10-CM

## 2019-07-24 DIAGNOSIS — Z34.83 ENCOUNTER FOR SUPERVISION OF OTHER NORMAL PREGNANCY IN THIRD TRIMESTER: Primary | ICD-10-CM

## 2019-07-24 LAB
1 HR GLUCOSE GESTATIONAL: 156 MG/DL
GLUCOSE 1H P GLC SERPL-MCNC: 105 MG/DL
GLUCOSE 3H P GLC SERPL-MCNC: 132 MG/DL
GLUCOSE P FAST SERPL-MCNC: 86 MG/DL
MULTISTIX LOT#: NORMAL NUMERIC
PH, URINE: 6.5 (ref 4.5–8)
SPECIFIC GRAVITY: 1.02 (ref 1–1.03)
URINE-COLOR: YELLOW
UROBILINOGEN,SEMI-QN: 0.2 MG/DL (ref 0–1.9)

## 2019-07-24 PROCEDURE — 81002 URINALYSIS NONAUTO W/O SCOPE: CPT | Performed by: ADVANCED PRACTICE MIDWIFE

## 2019-07-24 PROCEDURE — 82951 GLUCOSE TOLERANCE TEST (GTT): CPT

## 2019-07-24 PROCEDURE — 82952 GTT-ADDED SAMPLES: CPT

## 2019-07-24 PROCEDURE — 36415 COLL VENOUS BLD VENIPUNCTURE: CPT

## 2019-07-24 PROCEDURE — 0502F SUBSEQUENT PRENATAL CARE: CPT | Performed by: ADVANCED PRACTICE MIDWIFE

## 2019-07-24 RX ORDER — BREAST PUMP
EACH MISCELLANEOUS
Qty: 1 EACH | Refills: 0 | Status: SHIPPED | OUTPATIENT
Start: 2019-07-24

## 2019-07-24 NOTE — PROGRESS NOTES
Benjamin Sethi is active, its a boy. Has headache frequently that are relieved with tylenol. EDPS 10. Has increased stress. Has nasal congestion and cold will give TDAP at next visit.   Was counseled regarding  will talk with Dr. Reyes Barba at next visit for h

## 2019-07-30 ENCOUNTER — TELEPHONE (OUTPATIENT)
Dept: OBGYN CLINIC | Facility: CLINIC | Age: 24
End: 2019-07-30

## 2019-07-30 NOTE — TELEPHONE ENCOUNTER
Per pt is having toothaches and states her dentist is requiring a clearance note. Please advise would like to  in ADO.  Please advise

## 2019-08-04 NOTE — PROGRESS NOTES
Liv Menchaca    Dear Dr. Iliana Escobar    Thank you for requesting an ultrasound and maternal-fetal medicine consultation on your patient Deion Tam.   As you are aware she is a 26year old female  with a boyle review of records, consultation and coordination of care. Our discussion is summarized above. The approximate physician face-to-face time was 15 minutes.

## 2019-08-05 ENCOUNTER — HOSPITAL ENCOUNTER (OUTPATIENT)
Dept: PERINATAL CARE | Facility: HOSPITAL | Age: 24
Discharge: HOME | End: 2019-08-05
Attending: OBSTETRICS & GYNECOLOGY
Payer: MEDICAID

## 2019-08-05 ENCOUNTER — TELEPHONE (OUTPATIENT)
Dept: OBGYN CLINIC | Facility: CLINIC | Age: 24
End: 2019-08-05

## 2019-08-05 VITALS
DIASTOLIC BLOOD PRESSURE: 70 MMHG | HEART RATE: 107 BPM | BODY MASS INDEX: 35.68 KG/M2 | SYSTOLIC BLOOD PRESSURE: 112 MMHG | HEIGHT: 58 IN | WEIGHT: 170 LBS

## 2019-08-05 DIAGNOSIS — R00.0 TACHYCARDIA: ICD-10-CM

## 2019-08-05 DIAGNOSIS — Z36.89 ENCOUNTER FOR ULTRASOUND TO ASSESS FETAL GROWTH: Primary | ICD-10-CM

## 2019-08-05 DIAGNOSIS — R03.0 ELEVATED BP WITHOUT DIAGNOSIS OF HYPERTENSION: ICD-10-CM

## 2019-08-05 DIAGNOSIS — Z36.89 ENCOUNTER FOR ULTRASOUND TO ASSESS FETAL GROWTH: ICD-10-CM

## 2019-08-05 PROCEDURE — 99213 OFFICE O/P EST LOW 20 MIN: CPT | Performed by: OBSTETRICS & GYNECOLOGY

## 2019-08-05 PROCEDURE — 76816 OB US FOLLOW-UP PER FETUS: CPT | Performed by: OBSTETRICS & GYNECOLOGY

## 2019-08-05 PROCEDURE — 76819 FETAL BIOPHYS PROFIL W/O NST: CPT | Performed by: OBSTETRICS & GYNECOLOGY

## 2019-08-12 ENCOUNTER — TELEPHONE (OUTPATIENT)
Dept: OBGYN CLINIC | Facility: CLINIC | Age: 24
End: 2019-08-12

## 2019-08-12 ENCOUNTER — ROUTINE PRENATAL (OUTPATIENT)
Dept: OBGYN CLINIC | Facility: CLINIC | Age: 24
End: 2019-08-12
Payer: MEDICAID

## 2019-08-12 VITALS
DIASTOLIC BLOOD PRESSURE: 78 MMHG | WEIGHT: 172 LBS | BODY MASS INDEX: 36 KG/M2 | HEART RATE: 109 BPM | SYSTOLIC BLOOD PRESSURE: 120 MMHG

## 2019-08-12 DIAGNOSIS — Z34.93 NORMAL PREGNANCY IN THIRD TRIMESTER: Primary | ICD-10-CM

## 2019-08-12 LAB
APPEARANCE: CLEAR
MULTISTIX LOT#: NORMAL NUMERIC
PH, URINE: 7 (ref 4.5–8)
SPECIFIC GRAVITY: 1.02 (ref 1–1.03)
URINE-COLOR: YELLOW
UROBILINOGEN,SEMI-QN: 0.2 MG/DL (ref 0–1.9)

## 2019-08-12 PROCEDURE — 0502F SUBSEQUENT PRENATAL CARE: CPT | Performed by: OBSTETRICS & GYNECOLOGY

## 2019-08-12 PROCEDURE — 81002 URINALYSIS NONAUTO W/O SCOPE: CPT | Performed by: OBSTETRICS & GYNECOLOGY

## 2019-08-12 NOTE — TELEPHONE ENCOUNTER
Mile Marte MD routed conversation to Em Ob/Gyne [facility] Surgery/Referral 1 minute ago (3:42 PM)      Mile Marte MD 1 minute ago (3:41 PM)         Pt requested repeat c/s at 39 weeks,  Schedule repeat  section 20 for doctor on c

## 2019-08-15 ENCOUNTER — LAB ENCOUNTER (OUTPATIENT)
Dept: LAB | Age: 24
End: 2019-08-15
Attending: OBSTETRICS & GYNECOLOGY
Payer: MEDICAID

## 2019-08-15 ENCOUNTER — TELEPHONE (OUTPATIENT)
Dept: OBGYN CLINIC | Facility: CLINIC | Age: 24
End: 2019-08-15

## 2019-08-15 DIAGNOSIS — Z34.93 NORMAL PREGNANCY IN THIRD TRIMESTER: ICD-10-CM

## 2019-08-15 LAB
BASOPHILS # BLD AUTO: 0.02 X10(3) UL (ref 0–0.2)
BASOPHILS NFR BLD AUTO: 0.2 %
DEPRECATED RDW RBC AUTO: 45 FL (ref 35.1–46.3)
EOSINOPHIL # BLD AUTO: 0.1 X10(3) UL (ref 0–0.7)
EOSINOPHIL NFR BLD AUTO: 1.1 %
ERYTHROCYTE [DISTWIDTH] IN BLOOD BY AUTOMATED COUNT: 16.7 % (ref 11–15)
HCT VFR BLD AUTO: 36.4 % (ref 35–48)
HGB BLD-MCNC: 11.5 G/DL (ref 12–16)
IMM GRANULOCYTES # BLD AUTO: 0.05 X10(3) UL (ref 0–1)
IMM GRANULOCYTES NFR BLD: 0.5 %
LYMPHOCYTES # BLD AUTO: 1.63 X10(3) UL (ref 1–4)
LYMPHOCYTES NFR BLD AUTO: 17.6 %
MCH RBC QN AUTO: 24.1 PG (ref 26–34)
MCHC RBC AUTO-ENTMCNC: 31.6 G/DL (ref 31–37)
MCV RBC AUTO: 76.2 FL (ref 80–100)
MONOCYTES # BLD AUTO: 0.46 X10(3) UL (ref 0.1–1)
MONOCYTES NFR BLD AUTO: 5 %
NEUTROPHILS # BLD AUTO: 7 X10 (3) UL (ref 1.5–7.7)
NEUTROPHILS # BLD AUTO: 7 X10(3) UL (ref 1.5–7.7)
NEUTROPHILS NFR BLD AUTO: 75.6 %
PLATELET # BLD AUTO: 229 10(3)UL (ref 150–450)
RBC # BLD AUTO: 4.78 X10(6)UL (ref 3.8–5.3)
WBC # BLD AUTO: 9.3 X10(3) UL (ref 4–11)

## 2019-08-15 PROCEDURE — 87389 HIV-1 AG W/HIV-1&-2 AB AG IA: CPT

## 2019-08-15 PROCEDURE — 86780 TREPONEMA PALLIDUM: CPT

## 2019-08-15 PROCEDURE — 36415 COLL VENOUS BLD VENIPUNCTURE: CPT

## 2019-08-15 PROCEDURE — 85025 COMPLETE CBC W/AUTO DIFF WBC: CPT

## 2019-08-15 NOTE — TELEPHONE ENCOUNTER
Pt dropped off physical from to be filled out. Rn informed that form needs to be filled out by PCP. Called pt and unable to leave message. If pt calls back, please inform pt she needs to  form or if she wants it faxed to her PCP, we need a fax number.

## 2019-08-16 ENCOUNTER — HOSPITAL ENCOUNTER (OUTPATIENT)
Facility: HOSPITAL | Age: 24
Setting detail: OBSERVATION
Discharge: HOME | End: 2019-08-16
Attending: OBSTETRICS & GYNECOLOGY | Admitting: OBSTETRICS & GYNECOLOGY
Payer: MEDICAID

## 2019-08-16 VITALS — OXYGEN SATURATION: 98 % | DIASTOLIC BLOOD PRESSURE: 55 MMHG | HEART RATE: 130 BPM | SYSTOLIC BLOOD PRESSURE: 105 MMHG

## 2019-08-16 LAB
AMPHET UR QL SCN: NEGATIVE
CANNABINOIDS UR QL SCN: NEGATIVE
COCAINE UR QL: NEGATIVE
MDMA UR QL SCN: NEGATIVE
OPIATES UR QL SCN: NEGATIVE
OXYCODONE UR QL SCN: NEGATIVE
PCP UR QL SCN: NEGATIVE
T PALLIDUM AB SER QL: NEGATIVE

## 2019-08-16 PROCEDURE — 59025 FETAL NON-STRESS TEST: CPT

## 2019-08-16 PROCEDURE — 99212 OFFICE O/P EST SF 10 MIN: CPT

## 2019-08-16 PROCEDURE — 80307 DRUG TEST PRSMV CHEM ANLYZR: CPT | Performed by: OBSTETRICS & GYNECOLOGY
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
## 2019-08-16 NOTE — TRIAGE
[facility]      Triage Note    [address] Patient Status:  Observation    1995 MRN [id number]   Location [address]  Attending Astrid Delacruz MD   Hosp Day # 0 PCP Grady Mckeon PA-C Acoustic Stimulator: No           Nonstress Test Interpretation: Reactive           Nonstress Test Second Interpretation: Reactive          FHR Category: Category I           Additional Comments       Reason for visit: Pt presented to ob

## 2019-08-20 NOTE — TELEPHONE ENCOUNTER
Emery Galeano confirmed Dr. Renetta Fountain will assist.   Informed Gill Price in [facility].

## 2019-08-27 ENCOUNTER — ROUTINE PRENATAL (OUTPATIENT)
Dept: OBGYN CLINIC | Facility: CLINIC | Age: 24
End: 2019-08-27
Payer: MEDICAID

## 2019-08-27 VITALS — BODY MASS INDEX: 36 KG/M2 | WEIGHT: 172 LBS | DIASTOLIC BLOOD PRESSURE: 76 MMHG | SYSTOLIC BLOOD PRESSURE: 111 MMHG

## 2019-08-27 DIAGNOSIS — Z34.83 ENCOUNTER FOR SUPERVISION OF OTHER NORMAL PREGNANCY IN THIRD TRIMESTER: Primary | ICD-10-CM

## 2019-08-27 PROBLEM — T81.40XD POSTOPERATIVE INFECTION, SUBSEQUENT ENCOUNTER: Status: RESOLVED | Noted: 2019-04-03 | Resolved: 2019-08-27

## 2019-08-27 PROBLEM — R62.52 SHORT STATURE: Status: ACTIVE | Noted: 2019-08-27

## 2019-08-27 LAB
APPEARANCE: CLEAR
MULTISTIX LOT#: NORMAL NUMERIC
PH, URINE: 6 (ref 4.5–8)
SPECIFIC GRAVITY: 1.01 (ref 1–1.03)
URINE-COLOR: YELLOW
UROBILINOGEN,SEMI-QN: 0.2 MG/DL (ref 0–1.9)

## 2019-08-27 PROCEDURE — 81002 URINALYSIS NONAUTO W/O SCOPE: CPT | Performed by: OBSTETRICS & GYNECOLOGY

## 2019-08-27 PROCEDURE — 0502F SUBSEQUENT PRENATAL CARE: CPT | Performed by: OBSTETRICS & GYNECOLOGY

## 2019-09-06 ENCOUNTER — ROUTINE PRENATAL (OUTPATIENT)
Dept: OBGYN CLINIC | Facility: CLINIC | Age: 24
End: 2019-09-06

## 2019-09-06 VITALS — DIASTOLIC BLOOD PRESSURE: 68 MMHG | BODY MASS INDEX: 36 KG/M2 | SYSTOLIC BLOOD PRESSURE: 112 MMHG | WEIGHT: 174 LBS

## 2019-09-06 DIAGNOSIS — Z34.83 ENCOUNTER FOR SUPERVISION OF OTHER NORMAL PREGNANCY IN THIRD TRIMESTER: Primary | ICD-10-CM

## 2019-09-06 LAB
APPEARANCE: CLEAR
MULTISTIX LOT#: NORMAL NUMERIC
PH, URINE: 6.5 (ref 4.5–8)
SPECIFIC GRAVITY: 1.01 (ref 1–1.03)
URINE-COLOR: YELLOW
UROBILINOGEN,SEMI-QN: 0.2 MG/DL (ref 0–1.9)

## 2019-09-06 PROCEDURE — 81002 URINALYSIS NONAUTO W/O SCOPE: CPT | Performed by: OBSTETRICS & GYNECOLOGY

## 2019-09-06 PROCEDURE — 0502F SUBSEQUENT PRENATAL CARE: CPT | Performed by: OBSTETRICS & GYNECOLOGY

## 2019-09-11 ENCOUNTER — ROUTINE PRENATAL (OUTPATIENT)
Dept: OBGYN CLINIC | Facility: CLINIC | Age: 24
End: 2019-09-11
Payer: MEDICAID

## 2019-09-11 VITALS — DIASTOLIC BLOOD PRESSURE: 72 MMHG | WEIGHT: 176 LBS | SYSTOLIC BLOOD PRESSURE: 118 MMHG | BODY MASS INDEX: 37 KG/M2

## 2019-09-11 DIAGNOSIS — Z01.818 PRE-OP TESTING: ICD-10-CM

## 2019-09-11 DIAGNOSIS — Z34.83 ENCOUNTER FOR SUPERVISION OF OTHER NORMAL PREGNANCY IN THIRD TRIMESTER: Primary | ICD-10-CM

## 2019-09-11 PROCEDURE — 81002 URINALYSIS NONAUTO W/O SCOPE: CPT | Performed by: OBSTETRICS & GYNECOLOGY

## 2019-09-11 PROCEDURE — 0502F SUBSEQUENT PRENATAL CARE: CPT | Performed by: OBSTETRICS & GYNECOLOGY

## 2019-09-12 ENCOUNTER — OFFICE VISIT (OUTPATIENT)
Dept: FAMILY MEDICINE CLINIC | Facility: CLINIC | Age: 24
End: 2019-09-12
Payer: MEDICAID

## 2019-09-12 VITALS
WEIGHT: 116.63 LBS | RESPIRATION RATE: 14 BRPM | BODY MASS INDEX: 23.83 KG/M2 | DIASTOLIC BLOOD PRESSURE: 71 MMHG | HEART RATE: 96 BPM | HEIGHT: 58.7 IN | SYSTOLIC BLOOD PRESSURE: 110 MMHG

## 2019-09-12 DIAGNOSIS — Z00.00 ROUTINE GENERAL MEDICAL EXAMINATION AT A HEALTH CARE FACILITY: Primary | ICD-10-CM

## 2019-09-12 PROCEDURE — 99395 PREV VISIT EST AGE 18-39: CPT | Performed by: PHYSICIAN ASSISTANT

## 2019-09-12 NOTE — PROGRESS NOTES
HPI:     HPI  26year-old female is here for employment physical examination. Patient has been working at  for 4 years. Patient is doing fine at this time. Patient is scheduled for  next week.  Patient denies of chest pain, SOB, N/V/C/D, fev Never Smoker      Smokeless tobacco: Never Used    Substance and Sexual Activity      Alcohol use: Not Currently        Comment: occasional; not in pregnancy      Drug use: No      Sexual activity: Not Currently    Lifestyle      Physical activity: Normocephalic and atraumatic. Right Ear: Tympanic membrane, external ear and ear canal normal. Tympanic membrane is not erythematous.  No cerumen present  Left Ear: Tympanic membrane, external ear and ear canal normal. Tympanic membrane is not erythematou sign up for My Chart if not already registered.

## 2019-09-12 NOTE — ASSESSMENT & PLAN NOTE
Work physical form completed, cleared for all activities. Discussed the importance of maintaining health and staying UTD on vaccines.

## 2019-09-16 ENCOUNTER — TELEPHONE (OUTPATIENT)
Dept: OBGYN UNIT | Facility: HOSPITAL | Age: 24
End: 2019-09-16

## 2019-09-18 ENCOUNTER — ROUTINE PRENATAL (OUTPATIENT)
Dept: OBGYN CLINIC | Facility: CLINIC | Age: 24
End: 2019-09-18
Payer: MEDICAID

## 2019-09-18 ENCOUNTER — ANESTHESIA EVENT (OUTPATIENT)
Dept: OBGYN UNIT | Facility: HOSPITAL | Age: 24
End: 2019-09-18
Payer: MEDICAID

## 2019-09-18 ENCOUNTER — LAB ENCOUNTER (OUTPATIENT)
Dept: LAB | Facility: HOSPITAL | Age: 24
End: 2019-09-18
Attending: OBSTETRICS & GYNECOLOGY
Payer: MEDICAID

## 2019-09-18 VITALS — BODY MASS INDEX: 36 KG/M2 | SYSTOLIC BLOOD PRESSURE: 113 MMHG | WEIGHT: 177 LBS | DIASTOLIC BLOOD PRESSURE: 75 MMHG

## 2019-09-18 DIAGNOSIS — Z01.818 PRE-OP TESTING: ICD-10-CM

## 2019-09-18 DIAGNOSIS — Z34.93 NORMAL PREGNANCY IN THIRD TRIMESTER: Primary | ICD-10-CM

## 2019-09-18 LAB
ANTIBODY SCREEN: NEGATIVE
APPEARANCE: CLEAR
BASOPHILS # BLD AUTO: 0.02 X10(3) UL (ref 0–0.2)
BASOPHILS NFR BLD AUTO: 0.3 %
DEPRECATED RDW RBC AUTO: 44.2 FL (ref 35.1–46.3)
EOSINOPHIL # BLD AUTO: 0.05 X10(3) UL (ref 0–0.7)
EOSINOPHIL NFR BLD AUTO: 0.6 %
ERYTHROCYTE [DISTWIDTH] IN BLOOD BY AUTOMATED COUNT: 16.8 % (ref 11–15)
HCT VFR BLD AUTO: 35.4 % (ref 35–48)
HGB BLD-MCNC: 11.3 G/DL (ref 12–16)
IMM GRANULOCYTES # BLD AUTO: 0.06 X10(3) UL (ref 0–1)
IMM GRANULOCYTES NFR BLD: 0.8 %
LYMPHOCYTES # BLD AUTO: 1.54 X10(3) UL (ref 1–4)
LYMPHOCYTES NFR BLD AUTO: 19.5 %
MCH RBC QN AUTO: 23.8 PG (ref 26–34)
MCHC RBC AUTO-ENTMCNC: 31.9 G/DL (ref 31–37)
MCV RBC AUTO: 74.5 FL (ref 80–100)
MONOCYTES # BLD AUTO: 0.42 X10(3) UL (ref 0.1–1)
MONOCYTES NFR BLD AUTO: 5.3 %
MULTISTIX LOT#: NORMAL NUMERIC
NEUTROPHILS # BLD AUTO: 5.82 X10 (3) UL (ref 1.5–7.7)
NEUTROPHILS # BLD AUTO: 5.82 X10(3) UL (ref 1.5–7.7)
NEUTROPHILS NFR BLD AUTO: 73.5 %
PH, URINE: 6 (ref 4.5–8)
PLATELET # BLD AUTO: 233 10(3)UL (ref 150–450)
RBC # BLD AUTO: 4.75 X10(6)UL (ref 3.8–5.3)
RH BLOOD TYPE: POSITIVE
SPECIFIC GRAVITY: 1.01 (ref 1–1.03)
URINE-COLOR: YELLOW
UROBILINOGEN,SEMI-QN: 0.2 MG/DL (ref 0–1.9)
WBC # BLD AUTO: 7.9 X10(3) UL (ref 4–11)

## 2019-09-18 PROCEDURE — 81002 URINALYSIS NONAUTO W/O SCOPE: CPT | Performed by: OBSTETRICS & GYNECOLOGY

## 2019-09-18 PROCEDURE — 86900 BLOOD TYPING SEROLOGIC ABO: CPT

## 2019-09-18 PROCEDURE — 86901 BLOOD TYPING SEROLOGIC RH(D): CPT

## 2019-09-18 PROCEDURE — 86850 RBC ANTIBODY SCREEN: CPT

## 2019-09-18 PROCEDURE — 36415 COLL VENOUS BLD VENIPUNCTURE: CPT

## 2019-09-18 PROCEDURE — 85025 COMPLETE CBC W/AUTO DIFF WBC: CPT

## 2019-09-18 PROCEDURE — 0502F SUBSEQUENT PRENATAL CARE: CPT | Performed by: OBSTETRICS & GYNECOLOGY

## 2019-09-18 NOTE — H&P
[facility] Patient Status:  Surgery Admit - Inpt    1995 MRN [id number]   Location [address] [place] [address] Attending Adriel Grady MD   Hosp Day # 0 PCP Lucía Klein not in pregnancy       Allergies/Medications: Allergies:     Penicillins             HIVES  Medications:    No medications prior to admission.     Review of Systems:   As documented in HPI        Physical Exam:   BP: (113)/(75) 113/75    Constitutional: a all appropriate consents will be signed at the Hospital. Admission is planned for today.          Pascale Peoples MD  9/18/2019  5:01 PM

## 2019-09-18 NOTE — PROGRESS NOTES
No c/o. Good fm. Scheduled for rep c/section tomorrow morning.   Counseled on c/s risk/benefits:  Risks and benefits of surgery were discussed in detail including, but not limited to:  Bleeding, blood transfusion, infection, injury to intraabdominal organ

## 2019-09-19 ENCOUNTER — HOSPITAL ENCOUNTER (INPATIENT)
Facility: HOSPITAL | Age: 24
LOS: 3 days | Discharge: HOME | End: 2019-09-22
Attending: OBSTETRICS & GYNECOLOGY | Admitting: OBSTETRICS & GYNECOLOGY
Payer: MEDICAID

## 2019-09-19 ENCOUNTER — ANESTHESIA (OUTPATIENT)
Dept: OBGYN UNIT | Facility: HOSPITAL | Age: 24
End: 2019-09-19
Payer: MEDICAID

## 2019-09-19 PROBLEM — Z98.891 PREVIOUS CESAREAN SECTION: Status: ACTIVE | Noted: 2019-09-19

## 2019-09-19 PROCEDURE — 59514 CESAREAN DELIVERY ONLY: CPT | Performed by: OBSTETRICS & GYNECOLOGY

## 2019-09-19 RX ORDER — BUPIVACAINE HYDROCHLORIDE 7.5 MG/ML
INJECTION, SOLUTION INTRASPINAL
Status: COMPLETED | OUTPATIENT
Start: 2019-09-19 | End: 2019-09-19

## 2019-09-19 RX ORDER — METOCLOPRAMIDE HYDROCHLORIDE 5 MG/ML
10 INJECTION INTRAMUSCULAR; INTRAVENOUS ONCE
Status: COMPLETED | OUTPATIENT
Start: 2019-09-19 | End: 2019-09-19

## 2019-09-19 RX ORDER — BISACODYL 10 MG
10 SUPPOSITORY, RECTAL RECTAL
Status: DISCONTINUED | OUTPATIENT
Start: 2019-09-19 | End: 2019-09-22

## 2019-09-19 RX ORDER — FAMOTIDINE 10 MG/ML
INJECTION, SOLUTION INTRAVENOUS
Status: DISPENSED
Start: 2019-09-19 | End: 2019-09-19

## 2019-09-19 RX ORDER — LIDOCAINE HYDROCHLORIDE 10 MG/ML
INJECTION, SOLUTION INFILTRATION; PERINEURAL
Status: COMPLETED | OUTPATIENT
Start: 2019-09-19 | End: 2019-09-19

## 2019-09-19 RX ORDER — TRISODIUM CITRATE DIHYDRATE AND CITRIC ACID MONOHYDRATE 500; 334 MG/5ML; MG/5ML
30 SOLUTION ORAL ONCE
Status: DISCONTINUED | OUTPATIENT
Start: 2019-09-19 | End: 2019-09-19

## 2019-09-19 RX ORDER — IBUPROFEN 600 MG/1
600 TABLET ORAL EVERY 6 HOURS
Status: DISCONTINUED | OUTPATIENT
Start: 2019-09-19 | End: 2019-09-22

## 2019-09-19 RX ORDER — NALBUPHINE HCL 10 MG/ML
2.5 AMPUL (ML) INJECTION EVERY 4 HOURS PRN
Status: ACTIVE | OUTPATIENT
Start: 2019-09-19 | End: 2019-09-20

## 2019-09-19 RX ORDER — POLYETHYLENE GLYCOL 3350 17 G/17G
17 POWDER, FOR SOLUTION ORAL DAILY PRN
Status: DISCONTINUED | OUTPATIENT
Start: 2019-09-19 | End: 2019-09-22

## 2019-09-19 RX ORDER — MELATONIN
325
Status: DISCONTINUED | OUTPATIENT
Start: 2019-09-19 | End: 2019-09-22

## 2019-09-19 RX ORDER — SODIUM CHLORIDE, SODIUM LACTATE, POTASSIUM CHLORIDE, CALCIUM CHLORIDE 600; 310; 30; 20 MG/100ML; MG/100ML; MG/100ML; MG/100ML
INJECTION, SOLUTION INTRAVENOUS CONTINUOUS
Status: DISCONTINUED | OUTPATIENT
Start: 2019-09-19 | End: 2019-09-19

## 2019-09-19 RX ORDER — HYDROCODONE BITARTRATE AND ACETAMINOPHEN 7.5; 325 MG/1; MG/1
1 TABLET ORAL EVERY 6 HOURS PRN
Status: ACTIVE | OUTPATIENT
Start: 2019-09-19 | End: 2019-09-20

## 2019-09-19 RX ORDER — ENOXAPARIN SODIUM 100 MG/ML
40 INJECTION SUBCUTANEOUS EVERY 24 HOURS
Status: DISCONTINUED | OUTPATIENT
Start: 2019-09-19 | End: 2019-09-22

## 2019-09-19 RX ORDER — HYDROCODONE BITARTRATE AND ACETAMINOPHEN 7.5; 325 MG/1; MG/1
1 TABLET ORAL EVERY 4 HOURS PRN
Status: DISCONTINUED | OUTPATIENT
Start: 2019-09-20 | End: 2019-09-22

## 2019-09-19 RX ORDER — METOCLOPRAMIDE HYDROCHLORIDE 5 MG/ML
INJECTION INTRAMUSCULAR; INTRAVENOUS
Status: DISPENSED
Start: 2019-09-19 | End: 2019-09-19

## 2019-09-19 RX ORDER — AMMONIA INHALANTS 0.04 G/.3ML
0.3 INHALANT RESPIRATORY (INHALATION) AS NEEDED
Status: DISCONTINUED | OUTPATIENT
Start: 2019-09-19 | End: 2019-09-22

## 2019-09-19 RX ORDER — SODIUM CHLORIDE 0.9 % (FLUSH) 0.9 %
10 SYRINGE (ML) INJECTION AS NEEDED
Status: DISCONTINUED | OUTPATIENT
Start: 2019-09-19 | End: 2019-09-22

## 2019-09-19 RX ORDER — HYDROMORPHONE HYDROCHLORIDE 1 MG/ML
0.6 INJECTION, SOLUTION INTRAMUSCULAR; INTRAVENOUS; SUBCUTANEOUS
Status: ACTIVE | OUTPATIENT
Start: 2019-09-19 | End: 2019-09-20

## 2019-09-19 RX ORDER — ONDANSETRON 2 MG/ML
INJECTION INTRAMUSCULAR; INTRAVENOUS AS NEEDED
Status: DISCONTINUED | OUTPATIENT
Start: 2019-09-19 | End: 2019-09-19 | Stop reason: SURG

## 2019-09-19 RX ORDER — PROCHLORPERAZINE EDISYLATE 5 MG/ML
5 INJECTION INTRAMUSCULAR; INTRAVENOUS ONCE AS NEEDED
Status: ACTIVE | OUTPATIENT
Start: 2019-09-19 | End: 2019-09-19

## 2019-09-19 RX ORDER — METOCLOPRAMIDE 10 MG/1
10 TABLET ORAL ONCE
Status: COMPLETED | OUTPATIENT
Start: 2019-09-19 | End: 2019-09-19

## 2019-09-19 RX ORDER — PHENYLEPHRINE HCL 10 MG/ML
VIAL (ML) INJECTION AS NEEDED
Status: DISCONTINUED | OUTPATIENT
Start: 2019-09-19 | End: 2019-09-19 | Stop reason: SURG

## 2019-09-19 RX ORDER — FAMOTIDINE 20 MG/1
20 TABLET ORAL ONCE
Status: COMPLETED | OUTPATIENT
Start: 2019-09-19 | End: 2019-09-19

## 2019-09-19 RX ORDER — MORPHINE SULFATE 1 MG/ML
INJECTION, SOLUTION EPIDURAL; INTRATHECAL; INTRAVENOUS
Status: COMPLETED | OUTPATIENT
Start: 2019-09-19 | End: 2019-09-19

## 2019-09-19 RX ORDER — HYDROCODONE BITARTRATE AND ACETAMINOPHEN 7.5; 325 MG/1; MG/1
2 TABLET ORAL EVERY 6 HOURS PRN
Status: ACTIVE | OUTPATIENT
Start: 2019-09-19 | End: 2019-09-20

## 2019-09-19 RX ORDER — ACETAMINOPHEN 325 MG/1
650 TABLET ORAL EVERY 4 HOURS PRN
Status: DISCONTINUED | OUTPATIENT
Start: 2019-09-20 | End: 2019-09-22

## 2019-09-19 RX ORDER — ONDANSETRON 2 MG/ML
4 INJECTION INTRAMUSCULAR; INTRAVENOUS EVERY 6 HOURS PRN
Status: DISCONTINUED | OUTPATIENT
Start: 2019-09-19 | End: 2019-09-22

## 2019-09-19 RX ORDER — CLINDAMYCIN PHOSPHATE 900 MG/50ML
900 INJECTION INTRAVENOUS ONCE
Status: COMPLETED | OUTPATIENT
Start: 2019-09-19 | End: 2019-09-19

## 2019-09-19 RX ORDER — DEXTROSE, SODIUM CHLORIDE, SODIUM LACTATE, POTASSIUM CHLORIDE, AND CALCIUM CHLORIDE 5; .6; .31; .03; .02 G/100ML; G/100ML; G/100ML; G/100ML; G/100ML
INJECTION, SOLUTION INTRAVENOUS CONTINUOUS
Status: DISCONTINUED | OUTPATIENT
Start: 2019-09-19 | End: 2019-09-22

## 2019-09-19 RX ORDER — FAMOTIDINE 10 MG/ML
20 INJECTION, SOLUTION INTRAVENOUS ONCE
Status: COMPLETED | OUTPATIENT
Start: 2019-09-19 | End: 2019-09-19

## 2019-09-19 RX ORDER — DIPHENHYDRAMINE HYDROCHLORIDE 50 MG/ML
12.5 INJECTION INTRAMUSCULAR; INTRAVENOUS EVERY 4 HOURS PRN
Status: ACTIVE | OUTPATIENT
Start: 2019-09-19 | End: 2019-09-20

## 2019-09-19 RX ORDER — HYDROMORPHONE HYDROCHLORIDE 1 MG/ML
0.4 INJECTION, SOLUTION INTRAMUSCULAR; INTRAVENOUS; SUBCUTANEOUS
Status: DISPENSED | OUTPATIENT
Start: 2019-09-19 | End: 2019-09-20

## 2019-09-19 RX ORDER — DOCUSATE SODIUM 100 MG/1
100 CAPSULE, LIQUID FILLED ORAL
Status: DISCONTINUED | OUTPATIENT
Start: 2019-09-19 | End: 2019-09-22

## 2019-09-19 RX ORDER — ONDANSETRON 2 MG/ML
4 INJECTION INTRAMUSCULAR; INTRAVENOUS ONCE AS NEEDED
Status: ACTIVE | OUTPATIENT
Start: 2019-09-19 | End: 2019-09-19

## 2019-09-19 RX ORDER — DEXAMETHASONE SODIUM PHOSPHATE 4 MG/ML
VIAL (ML) INJECTION AS NEEDED
Status: DISCONTINUED | OUTPATIENT
Start: 2019-09-19 | End: 2019-09-19 | Stop reason: SURG

## 2019-09-19 RX ORDER — CHOLECALCIFEROL (VITAMIN D3) 25 MCG
1 TABLET,CHEWABLE ORAL DAILY
Status: DISCONTINUED | OUTPATIENT
Start: 2019-09-19 | End: 2019-09-22

## 2019-09-19 RX ORDER — SODIUM CHLORIDE 0.9 % (FLUSH) 0.9 %
10 SYRINGE (ML) INJECTION AS NEEDED
Status: DISCONTINUED | OUTPATIENT
Start: 2019-09-19 | End: 2019-09-19

## 2019-09-19 RX ORDER — HALOPERIDOL 5 MG/ML
0.5 INJECTION INTRAMUSCULAR ONCE AS NEEDED
Status: ACTIVE | OUTPATIENT
Start: 2019-09-19 | End: 2019-09-19

## 2019-09-19 RX ORDER — SODIUM PHOSPHATE, DIBASIC AND SODIUM PHOSPHATE, MONOBASIC 7; 19 G/133ML; G/133ML
1 ENEMA RECTAL ONCE AS NEEDED
Status: DISCONTINUED | OUTPATIENT
Start: 2019-09-19 | End: 2019-09-22

## 2019-09-19 RX ORDER — ACETAMINOPHEN 325 MG/1
650 TABLET ORAL EVERY 6 HOURS PRN
Status: DISCONTINUED | OUTPATIENT
Start: 2019-09-19 | End: 2019-09-19

## 2019-09-19 RX ORDER — TRISODIUM CITRATE DIHYDRATE AND CITRIC ACID MONOHYDRATE 500; 334 MG/5ML; MG/5ML
SOLUTION ORAL
Status: DISPENSED
Start: 2019-09-19 | End: 2019-09-19

## 2019-09-19 RX ORDER — DIPHENHYDRAMINE HCL 25 MG
25 CAPSULE ORAL EVERY 4 HOURS PRN
Status: ACTIVE | OUTPATIENT
Start: 2019-09-19 | End: 2019-09-20

## 2019-09-19 RX ORDER — TRISODIUM CITRATE DIHYDRATE AND CITRIC ACID MONOHYDRATE 500; 334 MG/5ML; MG/5ML
30 SOLUTION ORAL ONCE
Status: COMPLETED | OUTPATIENT
Start: 2019-09-19 | End: 2019-09-19

## 2019-09-19 RX ORDER — NALOXONE HYDROCHLORIDE 0.4 MG/ML
0.08 INJECTION, SOLUTION INTRAMUSCULAR; INTRAVENOUS; SUBCUTANEOUS
Status: ACTIVE | OUTPATIENT
Start: 2019-09-19 | End: 2019-09-20

## 2019-09-19 RX ORDER — SIMETHICONE 80 MG
80 TABLET,CHEWABLE ORAL 3 TIMES DAILY PRN
Status: DISCONTINUED | OUTPATIENT
Start: 2019-09-19 | End: 2019-09-22

## 2019-09-19 RX ADMIN — SODIUM CHLORIDE, SODIUM LACTATE, POTASSIUM CHLORIDE, CALCIUM CHLORIDE: 600; 310; 30; 20 INJECTION, SOLUTION INTRAVENOUS at 07:36:00

## 2019-09-19 RX ADMIN — SODIUM CHLORIDE, SODIUM LACTATE, POTASSIUM CHLORIDE, CALCIUM CHLORIDE: 600; 310; 30; 20 INJECTION, SOLUTION INTRAVENOUS at 08:48:00

## 2019-09-19 RX ADMIN — BUPIVACAINE HYDROCHLORIDE 1.4 ML: 7.5 INJECTION, SOLUTION INTRASPINAL at 07:35:00

## 2019-09-19 RX ADMIN — SODIUM CHLORIDE, SODIUM LACTATE, POTASSIUM CHLORIDE, CALCIUM CHLORIDE: 600; 310; 30; 20 INJECTION, SOLUTION INTRAVENOUS at 08:57:00

## 2019-09-19 RX ADMIN — PHENYLEPHRINE HCL 50 MCG: 10 MG/ML VIAL (ML) INJECTION at 08:08:00

## 2019-09-19 RX ADMIN — ONDANSETRON 4 MG: 2 INJECTION INTRAMUSCULAR; INTRAVENOUS at 08:32:00

## 2019-09-19 RX ADMIN — PHENYLEPHRINE HCL 100 MCG: 10 MG/ML VIAL (ML) INJECTION at 08:53:00

## 2019-09-19 RX ADMIN — LIDOCAINE HYDROCHLORIDE 5 ML: 10 INJECTION, SOLUTION INFILTRATION; PERINEURAL at 07:35:00

## 2019-09-19 RX ADMIN — PHENYLEPHRINE HCL 100 MCG: 10 MG/ML VIAL (ML) INJECTION at 07:48:00

## 2019-09-19 RX ADMIN — SODIUM CHLORIDE, SODIUM LACTATE, POTASSIUM CHLORIDE, CALCIUM CHLORIDE: 600; 310; 30; 20 INJECTION, SOLUTION INTRAVENOUS at 08:21:00

## 2019-09-19 RX ADMIN — PHENYLEPHRINE HCL 100 MCG: 10 MG/ML VIAL (ML) INJECTION at 08:35:00

## 2019-09-19 RX ADMIN — DEXAMETHASONE SODIUM PHOSPHATE 4 MG: 4 MG/ML VIAL (ML) INJECTION at 08:32:00

## 2019-09-19 RX ADMIN — MORPHINE SULFATE 0.2 MG: 1 INJECTION, SOLUTION EPIDURAL; INTRATHECAL; INTRAVENOUS at 07:35:00

## 2019-09-19 RX ADMIN — SODIUM CHLORIDE, SODIUM LACTATE, POTASSIUM CHLORIDE, CALCIUM CHLORIDE: 600; 310; 30; 20 INJECTION, SOLUTION INTRAVENOUS at 08:35:00

## 2019-09-19 RX ADMIN — SODIUM CHLORIDE, SODIUM LACTATE, POTASSIUM CHLORIDE, CALCIUM CHLORIDE: 600; 310; 30; 20 INJECTION, SOLUTION INTRAVENOUS at 08:20:00

## 2019-09-19 RX ADMIN — SODIUM CHLORIDE, SODIUM LACTATE, POTASSIUM CHLORIDE, CALCIUM CHLORIDE: 600; 310; 30; 20 INJECTION, SOLUTION INTRAVENOUS at 08:06:00

## 2019-09-19 NOTE — PLAN OF CARE
Problem: Patient Centered Care  Goal: Patient preferences are identified and integrated in the patient's plan of care  Description  Interventions:  - What would you like us to know as we care for you?   - Provide timely, complete, and accurate informatio Notify MD/LIP if interventions unsuccessful or patient reports new pain  - Anticipate increased pain with activity and pre-medicate as appropriate  Outcome: Progressing     Problem: ANXIETY  Goal: Will report anxiety at manageable levels  Description  INTE

## 2019-09-19 NOTE — PROGRESS NOTES
Pt arrived to Labor and Delivery triage room 5 for scheduled R C/S at 0730. , 39 weeks gestation. Positive GBS in urine.

## 2019-09-19 NOTE — ANESTHESIA PROCEDURE NOTES
Spinal Block  Date/Time: 9/19/2019 7:35 AM  Performed by: Joyce Parks MD  Authorized by: Joyce Parks MD     Patient Location:  OB  Start Time:  9/19/2019 7:40 AM  End Time:  9/19/2019 7:45 AM  Site identification: surface landmarks    Reason fo

## 2019-09-19 NOTE — ANESTHESIA PREPROCEDURE EVALUATION
Anesthesia PreOp Note    HPI:     Ramana Jain is a 26year old female who presents for preoperative consultation requested by: Leonard Swift MD    Date of Surgery: 2019    Procedure(s):   SECTION  Indication: History of previous Taking       Current Facility-Administered Medications Ordered in Epic:  famoTIDine (PEPCID) 20 MG/2ML injection         lactated ringers infusion  Intravenous Continuous Jefe Burgos MD     Normal Saline Flush 0.9 % injection 10 mL 10 mL Intravenous file    Relationships      Social connections:        Talks on phone: Not on file        Gets together: Not on file        Attends Adventism service: Not on file        Active member of club or organization: Not on file        Attends meetings of clubs or tested in [facility] when she was young.      Endo/Other      Comments: History of previous    Abdominal      Other findings: + gravid         Anesthesia Plan:   ASA:  2  Plan:   Spinal  Post-op Pain Management: IV analgesics  Plan Comments: Ms. Cherry Estrada

## 2019-09-19 NOTE — PROGRESS NOTES
Patient transferred to mother/baby room 366 per cart in stable condition with baby and personal belongings. Accompanied by  and staff. Report given to [facility] ANIKA.

## 2019-09-19 NOTE — ANESTHESIA POSTPROCEDURE EVALUATION
Patient: Kristen Coe    Procedure Summary     Date:  19 Room / Location:  [facility] L+D OR  [facility] L+D OR    Anesthesia Start:  6423 Anesthesia Stop:  7289    Procedure:   SECTION (N/A ) Diagnosis:      Surgeon:  Michelle Peters MD Ane

## 2019-09-20 ENCOUNTER — TELEPHONE (OUTPATIENT)
Dept: OBGYN CLINIC | Facility: CLINIC | Age: 24
End: 2019-09-20

## 2019-09-20 NOTE — PAYOR COMM NOTE
--------------  ADMISSION REVIEW     Payor: Edison Griffith #:  [id number]  Authorization Number: [id number]    Admit date: 9/19/19  Admit time: 0542           History & Physical    Date of Admission:  9/19/19      HPI bilaterally  Cardiac: regular rate and rhythm, S1, S2 normal, no murmur, click, rub or gallop  Abdomen: gravid  Presentation: vertex  EFW:  8 lb  Fetal heart rate:  140  Uterine monitoring:  --  Sterile vaginal exam: def    Extrem: no edema    Assessment/P docusate sodium (COLACE) cap 100 mg     Date Action Dose Route     9/20/2019 1013 Given 100 mg Oral     9/19/2019 2057 Given 100 mg Oral       Enoxaparin Sodium (LOVENOX) 40 MG/0.4ML injection 40 mg     Date Action Dose Route     9/19/2019 1802 Given 40

## 2019-09-20 NOTE — OPERATIVE REPORT
[facility]    PATIENT'S NAME: Contreras Wili   ATTENDING PHYSICIAN: Jaskaran Kent MD   OPERATING PHYSICIAN: Jaskaran Kent MD   PATIENT ACCOUNT#:   [id number]    LOCATION:  [address] [place] [address] #:   [id number]       DATE OF B tissue to the level of the anterior rectus fascia which was incised transversely, and sharply dissected away from the underlying rectus muscles. The rectus muscles were parted at the midline.   The parietal peritoneum was tented and entered with Billy dried, and bandaged in the standard manner. All sponge, instrument, and needle counts were correct. The Martinez drained clear urine, and the uterus was massaged and firm, and no active bleeding was noted. The procedure was ended.   The patient was transfer

## 2019-09-20 NOTE — PROGRESS NOTES
[facility]    OB/GYNE Progress Note      [address] Patient Status:  Surgery Admit - Inpt    1995 MRN [id number]   Location [facility] 3SE Attending Argelia Post MD   Hosp Day # 0 PCP Gamal Lopez [place] Lovenox for DVT proph      Caitlin Storey MD  9/20/2019  7:49 AM

## 2019-09-21 NOTE — PROGRESS NOTES
[facility]    OB/GYNE Progress Note      [address] Patient Status:  Inpatient    1995 MRN [id number]   Location [facility] 3SE Attending Nadia Weller MD   Hosp Day # 2 PCP LAZARO Dumont BILT 0.2 04/23/2019    TP 7.2 04/23/2019    AST 7 (L) 04/23/2019    ALT 12 (L) 04/23/2019    TSH 1.710 03/19/2019    LIP 94 04/23/2019       Lab Results   Component Value Date    DDIMER >4.00 (H) 03/29/2019    TROP <0.045 03/29/2019    COLORUR Yellow 04/23

## 2019-09-21 NOTE — PLAN OF CARE
Problem: Patient Centered Care  Goal: Patient preferences are identified and integrated in the patient's plan of care  Description  Interventions:  - What would you like us to know as we care for you?   - Provide timely, complete, and accurate informatio breast  Description  INTERVENTIONS:  - Initiate breast feeding within first hour after birth. - Monitor effectiveness of current breast feeding efforts.   - Assess support systems available to mother/family.  - Identify cultural beliefs/practices regardin coping mechanisms. - Encourage caregiver to participate in  daily care. - Assess support systems available to mother/family.  - Provide /case management support as needed.   Outcome: Progressing     Problem: GASTROINTESTINAL - ADULT over-consumption  Outcome: Progressing

## 2019-09-22 VITALS
HEART RATE: 96 BPM | SYSTOLIC BLOOD PRESSURE: 117 MMHG | WEIGHT: 175 LBS | BODY MASS INDEX: 36.73 KG/M2 | HEIGHT: 58 IN | RESPIRATION RATE: 18 BRPM | OXYGEN SATURATION: 100 % | TEMPERATURE: 98 F | DIASTOLIC BLOOD PRESSURE: 69 MMHG

## 2019-09-22 RX ORDER — HYDROCODONE BITARTRATE AND ACETAMINOPHEN 7.5; 325 MG/1; MG/1
1 TABLET ORAL EVERY 6 HOURS PRN
Qty: 25 TABLET | Refills: 0 | Status: SHIPPED | OUTPATIENT
Start: 2019-09-22 | End: 2019-11-08

## 2019-09-22 NOTE — PLAN OF CARE
Problem: Patient Centered Care  Goal: Patient preferences are identified and integrated in the patient's plan of care  Description  Interventions:  - What would you like us to know as we care for you?   - Provide timely, complete, and accurate informatio breast  Description  INTERVENTIONS:  - Initiate breast feeding within first hour after birth. - Monitor effectiveness of current breast feeding efforts.   - Assess support systems available to mother/family.  - Identify cultural beliefs/practices regardin mechanisms. - Encourage caregiver to participate in  daily care. - Assess support systems available to mother/family.  - Provide /case management support as needed.   Outcome: Completed     Problem: GASTROINTESTINAL - ADULT  Goal: Mi Completed     Pain controlled with Motrin. Voiding freely. Fundus firm and midline. Bleeding WDL.  VSS. Activity up ad ramón/self care. Diet tolerated. Encouraged ambulation. Denies SOB, chest pain, painful urination, and/or calf pain.   Interacting

## 2019-09-22 NOTE — DISCHARGE SUMMARY
[facility] - [facility]    Discharge Summary    [address] Patient Status:  Inpatient    1995 MRN [id number]   Location [facility] 3SE Attending Cecilia Platt MD   Hosp Day # 3 PCP Kerry Beasley, [facility]     Admit Date:

## 2019-09-23 ENCOUNTER — TELEPHONE (OUTPATIENT)
Dept: OBGYN CLINIC | Facility: CLINIC | Age: 24
End: 2019-09-23

## 2019-09-23 NOTE — TELEPHONE ENCOUNTER
Pt Name and  verified. Pt scheduled for 2pm tomorrow for Staple Removal with the nurses. No further questions.

## 2019-09-23 NOTE — TELEPHONE ENCOUNTER
Pt would like to schedule apt for adi removed Dr Kaye Alvarez delivered , pt child has apt 9/22 at [facility] 150 at 1pm

## 2019-09-24 ENCOUNTER — NURSE ONLY (OUTPATIENT)
Dept: OBGYN CLINIC | Facility: CLINIC | Age: 24
End: 2019-09-24
Payer: MEDICAID

## 2019-09-24 VITALS — TEMPERATURE: 98 F

## 2019-09-24 PROCEDURE — 99024 POSTOP FOLLOW-UP VISIT: CPT | Performed by: OBSTETRICS & GYNECOLOGY

## 2019-10-11 ENCOUNTER — POSTPARTUM (OUTPATIENT)
Dept: OBGYN CLINIC | Facility: CLINIC | Age: 24
End: 2019-10-11
Payer: MEDICAID

## 2019-10-11 VITALS
HEIGHT: 58 IN | SYSTOLIC BLOOD PRESSURE: 104 MMHG | WEIGHT: 153 LBS | BODY MASS INDEX: 32.12 KG/M2 | DIASTOLIC BLOOD PRESSURE: 62 MMHG

## 2019-10-11 DIAGNOSIS — Z98.890 POSTOPERATIVE STATE: Primary | ICD-10-CM

## 2019-10-11 PROBLEM — Z34.90 PREGNANCY: Status: RESOLVED | Noted: 2019-06-18 | Resolved: 2019-10-11

## 2019-10-11 PROBLEM — Z98.891 PREVIOUS CESAREAN SECTION: Status: RESOLVED | Noted: 2019-09-19 | Resolved: 2019-10-11

## 2019-10-11 PROBLEM — Z98.891 H/O CESAREAN SECTION: Status: RESOLVED | Noted: 2019-02-28 | Resolved: 2019-10-11

## 2019-10-11 PROBLEM — R00.0 TACHYCARDIA: Status: RESOLVED | Noted: 2019-03-18 | Resolved: 2019-10-11

## 2019-10-11 PROBLEM — R03.0 ELEVATED BP WITHOUT DIAGNOSIS OF HYPERTENSION: Status: RESOLVED | Noted: 2019-04-03 | Resolved: 2019-10-11

## 2019-10-11 PROBLEM — Z34.90 SUPERVISION OF NORMAL PREGNANCY: Status: RESOLVED | Noted: 2019-03-18 | Resolved: 2019-10-11

## 2019-10-11 PROBLEM — R82.71 GBS BACTERIURIA: Status: RESOLVED | Noted: 2019-03-11 | Resolved: 2019-10-11

## 2019-10-11 PROBLEM — Z34.90 SUPERVISION OF NORMAL PREGNANCY (HCC): Status: RESOLVED | Noted: 2019-03-18 | Resolved: 2019-10-11

## 2019-10-11 PROBLEM — Z00.00 ROUTINE GENERAL MEDICAL EXAMINATION AT A HEALTH CARE FACILITY: Status: RESOLVED | Noted: 2019-09-12 | Resolved: 2019-10-11

## 2019-10-11 PROBLEM — O34.219 PREVIOUS CESAREAN DELIVERY, ANTEPARTUM CONDITION OR COMPLICATION: Status: RESOLVED | Noted: 2019-07-09 | Resolved: 2019-10-11

## 2019-10-11 PROBLEM — Z34.90 PREGNANCY (HCC): Status: RESOLVED | Noted: 2019-06-18 | Resolved: 2019-10-11

## 2019-10-11 PROBLEM — O34.219 PREVIOUS CESAREAN DELIVERY, ANTEPARTUM CONDITION OR COMPLICATION (HCC): Status: RESOLVED | Noted: 2019-07-09 | Resolved: 2019-10-11

## 2019-10-11 PROCEDURE — 99024 POSTOP FOLLOW-UP VISIT: CPT | Performed by: OBSTETRICS & GYNECOLOGY

## 2019-10-11 NOTE — PROGRESS NOTES
HPI:    Patient ID: Shaun Marshall is a 26year old female. HPI  Postop visit  S/p repeat c section 3 wks ago. No complaints. Bleeding has stopped. Bottle and breast feeding. Desires Mirena IUD.   Discussed w Dr. Blue Mckay who advises insertion at 8

## 2019-11-08 ENCOUNTER — POSTPARTUM (OUTPATIENT)
Dept: OBGYN CLINIC | Facility: CLINIC | Age: 24
End: 2019-11-08
Payer: MEDICAID

## 2019-11-08 VITALS — WEIGHT: 160.63 LBS | DIASTOLIC BLOOD PRESSURE: 68 MMHG | BODY MASS INDEX: 34 KG/M2 | SYSTOLIC BLOOD PRESSURE: 100 MMHG

## 2019-11-08 DIAGNOSIS — R87.612 PAPANICOLAOU SMEAR OF CERVIX WITH LOW GRADE SQUAMOUS INTRAEPITHELIAL LESION (LGSIL): ICD-10-CM

## 2019-11-08 PROBLEM — Z98.890 POSTOPERATIVE STATE: Status: RESOLVED | Noted: 2019-10-11 | Resolved: 2019-11-08

## 2019-11-08 PROCEDURE — 0503F POSTPARTUM CARE VISIT: CPT | Performed by: OBSTETRICS & GYNECOLOGY

## 2019-11-08 NOTE — PROGRESS NOTES
HPI:    Patient ID: Deion Tam is a 26year old year old female. HPI  Postop visit  Status post repeat  section. No complaints. Is breast and bottlefeeding. Is interested and IUD and has an appointment with Dr. Danie Stoll.   Has abnor transfusion and    • Previous  section 2019       Past Surgical History:   Procedure Laterality Date   •      •  SECTION N/A 2019    Performed by Cecilia Platt MD at [address] [place] [address] L+D OR   • D & C         No family history on f range of motion of upper and lower extremities. Neurological: She is alert and oriented x 3. Skin: Skin is warm without pallor. Psychiatric: Her behavior is normal. Judgment normal.  Able to communicate verbally. HEENT:  EOMI. URIEL.   Sclera anict tenderness. Uterus- normal size, shape, and contour. Nontender. No masses. Adnexa-  Nontender, no masses. Perineum- normal without lesions  Anus-  Normal appearing without lesions.     ASSESSMENT/PLAN:    (Z39.2) Postpartum state  (primary encounter

## 2019-11-14 ENCOUNTER — OFFICE VISIT (OUTPATIENT)
Dept: OBGYN CLINIC | Facility: CLINIC | Age: 24
End: 2019-11-14
Payer: MEDICAID

## 2019-11-14 VITALS — BODY MASS INDEX: 33 KG/M2 | SYSTOLIC BLOOD PRESSURE: 119 MMHG | WEIGHT: 160 LBS | DIASTOLIC BLOOD PRESSURE: 68 MMHG

## 2019-11-14 DIAGNOSIS — Z01.812 PRE-PROCEDURAL LABORATORY EXAMINATION: ICD-10-CM

## 2019-11-14 DIAGNOSIS — R87.612 LGSIL OF CERVIX OF UNDETERMINED SIGNIFICANCE: Primary | ICD-10-CM

## 2019-11-14 DIAGNOSIS — R87.612 PAPANICOLAOU SMEAR OF CERVIX WITH LOW GRADE SQUAMOUS INTRAEPITHELIAL LESION (LGSIL): ICD-10-CM

## 2019-11-14 PROCEDURE — 57421 EXAM/BIOPSY OF VAG W/SCOPE: CPT | Performed by: OBSTETRICS & GYNECOLOGY

## 2019-11-14 PROCEDURE — 81025 URINE PREGNANCY TEST: CPT | Performed by: OBSTETRICS & GYNECOLOGY

## 2019-11-14 NOTE — PROCEDURES
Colpo w/Biopsy Cervix     Pregnancy Results: negative from urine test   Birth control method(s) used: ocp     Consent signed. Procedure discussed with patient in detail including indication, risk, benefits, alternatives and complications.     Procedure:  T

## 2019-11-16 ENCOUNTER — TELEPHONE (OUTPATIENT)
Dept: OBGYN CLINIC | Facility: CLINIC | Age: 24
End: 2019-11-16

## 2019-11-16 NOTE — TELEPHONE ENCOUNTER
----- Message from Emma Reddy MD sent at 11/15/2019  4:43 PM CST -----  Please inform patient that cervix biopsy was benign. It showed mild dysplasia which is a mild change in the cells which is not cancerous or precancerous.   It usually resolves ov

## 2019-11-16 NOTE — TELEPHONE ENCOUNTER
Patient informed of the biopsy results, has already an alissa with [facility] for IUD insertion. She would like to schedule her 6mos pap at her IUD visit.  Patient put in recall log for 5/2019 repeat pap

## 2019-11-19 NOTE — PROGRESS NOTES
Please notify by MyChart or letter of normal Pap test and normal HPV cotesting.   Pap test in one year recommended

## 2019-11-22 ENCOUNTER — OFFICE VISIT (OUTPATIENT)
Dept: OBGYN CLINIC | Facility: CLINIC | Age: 24
End: 2019-11-22
Payer: MEDICAID

## 2019-11-22 VITALS — SYSTOLIC BLOOD PRESSURE: 100 MMHG | BODY MASS INDEX: 34 KG/M2 | WEIGHT: 164 LBS | DIASTOLIC BLOOD PRESSURE: 66 MMHG

## 2019-11-22 DIAGNOSIS — Z01.812 PRE-PROCEDURAL LABORATORY EXAMINATION: Primary | ICD-10-CM

## 2019-11-22 DIAGNOSIS — Z30.430 ENCOUNTER FOR INSERTION OF INTRAUTERINE CONTRACEPTIVE DEVICE: ICD-10-CM

## 2019-11-22 PROCEDURE — 58300 INSERT INTRAUTERINE DEVICE: CPT | Performed by: OBSTETRICS & GYNECOLOGY

## 2019-11-22 PROCEDURE — 81025 URINE PREGNANCY TEST: CPT | Performed by: OBSTETRICS & GYNECOLOGY

## 2019-11-22 NOTE — PROCEDURES
IUD Insertion     Pregnancy Results: negative from urine test     Consent signed. Procedure discussed with the patient in detail including indication, risks, benefits, alternatives and complications.     Pelvic Exam Findings:  Lesion description: none    P

## 2019-12-04 NOTE — TELEPHONE ENCOUNTER
Dr. Dot Liu while reviewing our log books, we just wanted to clarify if you want your patient to follow up either  in 6 months or a year? Please see result note on 11/14/2019 and please advise. Please send back to clinical staff.

## 2020-10-21 ENCOUNTER — TELEPHONE (OUTPATIENT)
Dept: OBGYN CLINIC | Facility: CLINIC | Age: 25
End: 2020-10-21

## 2020-12-23 ENCOUNTER — TELEPHONE (OUTPATIENT)
Dept: OBGYN CLINIC | Facility: CLINIC | Age: 25
End: 2020-12-23

## 2020-12-23 NOTE — TELEPHONE ENCOUNTER
20 Pt  was verified with pt. Pt was informed she was due for her 1 year follow up pap. Due to hx of ascus pap. Pt stated she went to [facility] since right now she has no insurance. She will be doing her follow up pap with them, since its cheaper.

## 2022-01-26 NOTE — TELEPHONE ENCOUNTER
Patient was seen at [facility] on 08/05 for [address] (cpt [id number]). Her [employer] would like an authorization from [employer]. Please call [phone] to obtain auth. Thank you very much.
Pt requested repeat c/s at 39 weeks,  Schedule repeat  section 20 for doctor on call.
PAST MEDICAL HISTORY:  HTN (hypertension)     Iron deficiency     Lymphoma

## 2022-09-29 ENCOUNTER — TELEPHONE (OUTPATIENT)
Dept: OBGYN CLINIC | Facility: CLINIC | Age: 27
End: 2022-09-29

## 2022-09-29 NOTE — TELEPHONE ENCOUNTER
Pt seeing another provider for an IUD exchange 9/29 at [facility] and asking what her current IUD is.     Please Norm Ross

## 2023-07-12 ENCOUNTER — OFFICE VISIT (OUTPATIENT)
Dept: OBGYN CLINIC | Facility: CLINIC | Age: 28
End: 2023-07-12

## 2023-07-12 VITALS — WEIGHT: 165 LBS | HEIGHT: 59 IN | BODY MASS INDEX: 33.26 KG/M2

## 2023-07-12 DIAGNOSIS — N91.2 AMENORRHEA: ICD-10-CM

## 2023-07-12 DIAGNOSIS — N92.6 MISSED MENSES: Primary | ICD-10-CM

## 2023-07-12 DIAGNOSIS — Z98.891 HISTORY OF 2 CESAREAN SECTIONS: ICD-10-CM

## 2023-07-12 LAB
CONTROL LINE PRESENT WITH A CLEAR BACKGROUND (YES/NO): YES YES/NO
KIT LOT #: NORMAL NUMERIC
PREGNANCY TEST, URINE: POSITIVE

## 2023-07-12 NOTE — PROGRESS NOTES
@NAME is a 33year old female who presents for a    1) amenorrhea/missed menses/pcv:  Pt was counseled extensively on pregnancy care including diet in pregnancy/medications in pregnancy/weight gain in pregnancy/travel precautions in pregnancy/zika and covid 19 precautions in pregnancy/ genetic testing in pregnancy/ dietary restrictions in pregnancy/among others and the pt voiced her understanding and all questions answered. Positive ucg noted today, pt counseled. Edc 24   ega 11 6/7 weeks. Wt Readings from Last 6 Encounters:  19 : 164 lb (74.4 kg)  19 : 160 lb (72.6 kg)  19 : 160 lb 9.6 oz (72.8 kg)  10/11/19 : 153 lb (69.4 kg)  19 : 175 lb (79.4 kg)  19 : 177 lb (80.3 kg)      There is no height or weight on file to calculate BMI. AST (U/L)   Date Value   2019 7 (L)   2019 7 (L)   2014 30   2014 34     ALT (U/L)   Date Value   2019 12 (L)   2019 15   2014 60 (H)        Current Outpatient Medications   Medication Sig Dispense Refill    Misc. Devices (BREAST PUMP) Does not apply Misc DOUBLE ELECTRIC BREAST PUMP EQUIVALENT TO MEDELA PUMP IN STYLE 1 each 0    Ferrous Sulfate 325 (65 Fe) MG Oral Tab Take 1 tablet (325 mg total) by mouth daily with breakfast. 30 tablet 6    Prenatal Vit-DSS-Fe Fum-FA (PRENATAL 19) Oral Tab Take by mouth. Past Medical History:   Diagnosis Date    Anemia     Decorative tattoo     H/O  section 2019    Thinking about VTOL. Not good candidate. Requesting planned repeat c section @39 weeks Delivered at 41-1/2 weeks gestational age by a low transverse  section for arrest of descent, persistent occiput posterior. Had chorioamnionitis. Baby 8 lb 6 oz  PREOPERATIVE DIAGNOSES: 1. Postterm pregnancy at 41 2/7ths weeks gestation. 2. Arrest of descent. 3. Meconium stained fluid.  4. Chorioamnion    Hepatitis B virus infection     Previous  delivery, antepartum condition or complication 1475    Pt counseled on Vaginal Birth After  () option. Discussed risks of  including uterine rupture with increased risks of  MORTALITY or morbidity which can include hypoxic brain injury. Discussed maternal risks that include hemorrhage. Patient counseled on option of repeat  surgery. Pt counseled on risks of repeat  including infection, bleeding, transfusion and     Previous  section 2019      Past Surgical History:   Procedure Laterality Date          D & C        No family history on file. Social History:   Social History     Socioeconomic History    Marital status: Single   Tobacco Use    Smoking status: Never    Smokeless tobacco: Never   Vaping Use    Vaping Use: Never used   Substance and Sexual Activity    Alcohol use: Not Currently     Comment: occasional; not in pregnancy    Drug use: No    Sexual activity: Not Currently            REVIEW OF SYSTEMS:   GENERAL: feels well otherwise  SKIN: denies any unusual skin lesions  EYES:denies blurred vision or double vision  HEENT: denies nasal congestion, sinus pain or ST  LUNGS: denies shortness of breath with exertion  CARDIOVASCULAR: denies chest pain on exertion  GI: denies abdominal pain,denies heartburn  : denies dysuria, vaginal discharge or itching,periods regular   MUSCULOSKELETAL: denies back pain  NEURO: denies headaches  PSYCHE: denies depression or anxiety  HEMATOLOGIC: denies hx of anemia  ENDOCRINE: denies thyroid history  ALL/ASTHMA: denies hx of allergy or asthma    EXAM:   There were no vitals taken for this visit. There is no height or weight on file to calculate BMI.    GENERAL: well developed, well nourished,in no apparent distress  SKIN: no rashes,no suspicious lesions  HEENT: atraumatic, normocephalic  EYES:normal in appearance  NECK: supple,no adenopathy  CHEST: no chest tenderness  LUNGS: clear to auscultation  CARDIO: RRR without murmur  GI: good BS's,no masses, HSM or tenderness  fhts 155    MUSCULOSKELETAL: back is not tender,FROM of the back  EXTREMITIES: no cyanosis, clubbing or edema  NEURO: Oriented times three    Collected 7/12/2023  2:19 PM       Status: Final result       Dx: Missed menses    0 Result Notes      Component  Ref Range & Units 7/12/23  2:19 PM   Pregnancy Test, Urine Positive   Control Line Present with a clear background (yes/no)  Yes/No yes   Kit Lot #  Numeric [id number]   Kit Expiration Date  Date 7/17/2024              Specimen Collected: 07/12/23  2:19 PM Last Resulted: 07/12/23  2:19 PM             Component  Ref Range & Units 6/16/23  4:03 PM   DIAGNOSIS    Comment: NEGATIVE FOR INTRAEPITHELIAL LESION OR MALIGNANCY. SPECIMEN ADEQUACY    Comment: Satisfactory for evaluation. Endocervical and/or squamous metaplastic  cells (endocervical component) are present. Areas of partially obscuring inflammatory exudate are present. CLINICIAN PROVIDED ICD10    Comment: O09.91  Z12.4   PERFORMED BY    Comment: Ro Hull, Cytotechnologist (ASCP)   CYTOSFERNANDO . NOTE    Comment: The Pap smear is a screening test designed to aid in the detection of  premalignant and malignant conditions of the uterine cervix. It is not a  diagnostic procedure and should not be used as the sole means of detecting  cervical cancer. Both false-positive and false-negative reports do occur. .   TEST METHODOLOGY    Comment: This liquid based ThinPrep(R) pap test was screened with the  use of an image guided system. HPV APTIMA  Negative Negative   Comment: This nucleic acid amplification test detects fourteen high-risk  HPV types (16,18,31,33,35,39,45,51,52,56,58,59,66,68) without  differentiation. HPV GENOTYPE REFLEX    Comment: Criteria not met, HPV Genotype not performed.    Resulting Agency [address] [facility]     Specimen Collected: 06/16/23  4:03 PM Last Resulted: 06/20/23  2:08 PM   Received From: [facility]  Result Received: 07/12/23  2:14 PM     Component 11/14/19 11:18 AM   Case Report Surgical Pathology                                Case: [id number]                                  Authorizing Provider:  Kristy Paul MD       Collected:           11/14/2019 11:18 AM          Ordering Location:     [facility] [place]     Received:            11/14/2019 11:18 AM                                 Amos Maciel                                                          Pathologist:           Hannah Roger MD                                                        Specimen:    Cervix                                                                                 Final Diagnosis:       Cervix; biopsy:  Transformation zone with low-grade squamous intraepithelial lesion (ROSANNE-1). Electronically signed by Hannah Roger MD on 11/15/2019 at 11:26 AM   Embedded Images    Clinical Information    D53.699 Lgsil Of Cervix Of Undetermined Significance. Gross Description    The specimen is submitted in formalin labeled \"Dominguez Trejo, cervix\" and consists of one fragment of pink-tan cervical tissue measuring 0.4 cm in greatest dimension. The specimen is submitted entirely in cassette A1. (jq)     Lizeth Menendez.  Hardik Gibson M.D./[facility]   Interpretation Abnormal Abnormal    Electronically signed by Hannah Roger MD on 11/15/2019 at 11:26 AM   Resulting Agency [facility]     Specimen Collected: 11/14/19 11:18 AM Last Resulted: 11/15/19 11:26 AM   Received From: [facility]  Result Received: 07/12/23  2:14 PM    is a 33year old female who presents for a     1) amenorrhea/missed menses/pcv:  Pt was counseled extensively on pregnancy care including diet in pregnancy/medications in pregnancy/weight gain in pregnancy/travel precautions in pregnancy/zika and covid 19 precautions in pregnancy/ genetic testing in pregnancy/ dietary restrictions in pregnancy/among others and the pt voiced her understanding and all questions answered. Positive ucg noted today, pt counseled. Pt to decide on genetic testing, is calling her insurance company . To schedule educational visit within 1 week. Hx of thanh 1   recent pap wnl noted on review.

## 2023-07-14 ENCOUNTER — OFFICE VISIT (OUTPATIENT)
Dept: OBGYN CLINIC | Facility: CLINIC | Age: 28
End: 2023-07-14

## 2023-07-14 DIAGNOSIS — N92.6 MISSED MENSES: ICD-10-CM

## 2023-07-14 DIAGNOSIS — Z98.891 HISTORY OF 2 CESAREAN SECTIONS: ICD-10-CM

## 2023-07-14 DIAGNOSIS — N91.2 AMENORRHEA: Primary | ICD-10-CM

## 2023-07-14 PROCEDURE — 99214 OFFICE O/P EST MOD 30 MIN: CPT | Performed by: OBSTETRICS & GYNECOLOGY

## 2023-07-14 NOTE — PROGRESS NOTES
@NAME is a 33year old female who presents for a    1) amenorrhea/missed menses/pcv:  Pt was counseled extensively on pregnancy care including diet in pregnancy/medications in pregnancy/weight gain in pregnancy/travel precautions in pregnancy/zika and covid 19 precautions in pregnancy/ genetic testing in pregnancy/ dietary restrictions in pregnancy/among others and the pt voiced her understanding and all questions answered. Positive ucg noted pt counseled. Wt Readings from Last 6 Encounters:  23 : 165 lb (74.8 kg)  19 : 164 lb (74.4 kg)  19 : 160 lb (72.6 kg)  19 : 160 lb 9.6 oz (72.8 kg)  10/11/19 : 153 lb (69.4 kg)  19 : 175 lb (79.4 kg)      There is no height or weight on file to calculate BMI. AST (U/L)   Date Value   2019 7 (L)   2019 7 (L)   2014 30   2014 34     ALT (U/L)   Date Value   2019 12 (L)   2019 15   2014 60 (H)        Current Outpatient Medications   Medication Sig Dispense Refill    Prenatal Vit-DSS-Fe Fum-FA (PRENATAL 19) Oral Tab Take by mouth. Misc. Devices (BREAST PUMP) Does not apply Misc DOUBLE ELECTRIC BREAST PUMP EQUIVALENT TO [address] PUMP IN STYLE (Patient not taking: Reported on 2023) 1 each 0      Past Medical History:   Diagnosis Date    Anemia     Decorative tattoo     H/O  section 2019    Thinking about VTOL. Not good candidate. Requesting planned repeat c section @39 weeks Delivered at 41-1/2 weeks gestational age by a low transverse  section for arrest of descent, persistent occiput posterior. Had chorioamnionitis. Baby 8 lb 6 oz  PREOPERATIVE DIAGNOSES: 1. Postterm pregnancy at 41 2/7ths weeks gestation. 2. Arrest of descent. 3. Meconium stained fluid. 4. Chorioamnion    Hepatitis B virus infection     Previous  delivery, antepartum condition or complication 6340    Pt counseled on Vaginal Birth After  () option.  Discussed risks of  including uterine rupture with increased risks of  MORTALITY or morbidity which can include hypoxic brain injury. Discussed maternal risks that include hemorrhage. Patient counseled on option of repeat  surgery. Pt counseled on risks of repeat  including infection, bleeding, transfusion and     Previous  section 2019      Past Surgical History:   Procedure Laterality Date          D & C        No family history on file. Social History:   Social History     Socioeconomic History    Marital status: Single   Tobacco Use    Smoking status: Never    Smokeless tobacco: Never   Vaping Use    Vaping Use: Never used   Substance and Sexual Activity    Alcohol use: Not Currently     Comment: occasional; not in pregnancy    Drug use: No    Sexual activity: Not Currently            REVIEW OF SYSTEMS:   GENERAL: feels well otherwise  SKIN: denies any unusual skin lesions  EYES:denies blurred vision or double vision  HEENT: denies nasal congestion, sinus pain or ST  LUNGS: denies shortness of breath with exertion  CARDIOVASCULAR: denies chest pain on exertion  GI: denies abdominal pain,denies heartburn  : denies dysuria, vaginal discharge or itching,periods regular   MUSCULOSKELETAL: denies back pain  NEURO: denies headaches  PSYCHE: denies depression or anxiety  HEMATOLOGIC: denies hx of anemia  ENDOCRINE: denies thyroid history  ALL/ASTHMA: denies hx of allergy or asthma    EXAM:   LMP 2023 (Exact Date)   There is no height or weight on file to calculate BMI.    GENERAL: well developed, well nourished,in no apparent distress  SKIN: no rashes,no suspicious lesions  HEENT: atraumatic, normocephalic  EYES:normal in appearance  NECK: supple,no adenopathy  CHEST: no chest tenderness  LUNGS: clear to auscultation  CARDIO: RRR without murmur  GI: good BS's,no masses, HSM or tenderness    MUSCULOSKELETAL: back is not tender,FROM of the back  EXTREMITIES: no cyanosis, clubbing or edema  NEURO: Oriented times three    Tv ultrasound :   siup  12  6/7 weeks and EDC 1/20/24= FINAL EDC ,    fhts 162, fm noted, normal uterus/adnexae/cx. ASSESSMENT AND PLAN:   Maria Alejandra Joy is a 33year old female who presents for a     1) amenorrhea/missed menses/pcv:  Pt was counseled extensively on pregnancy care including diet in pregnancy/medications in pregnancy/weight gain in pregnancy/travel precautions in pregnancy/zika and covid 19 precautions in pregnancy/ genetic testing in pregnancy/ dietary restrictions in pregnancy/among others and the pt voiced her understanding and all questions answered. Positive ucg noted today, pt counseled.

## 2023-07-20 ENCOUNTER — NURSE ONLY (OUTPATIENT)
Dept: OBGYN CLINIC | Facility: CLINIC | Age: 28
End: 2023-07-20

## 2023-07-20 DIAGNOSIS — O09.299 HISTORY OF MACROSOMIA IN INFANT IN PRIOR PREGNANCY, CURRENTLY PREGNANT: ICD-10-CM

## 2023-07-20 DIAGNOSIS — Z34.80 SUPERVISION OF OTHER NORMAL PREGNANCY, ANTEPARTUM: Primary | ICD-10-CM

## 2023-07-20 NOTE — PROGRESS NOTES
Pt called today for RN [facility] Education. Missed menses apt with: KRISS  LMP: 23    Pre  BMI: 33.3  EPDS score: 0/30  +UPT in office:     US:   Working YASHIRA: 24  Hx of genetic abnormality in family: denies  Hx of varicella: vaccine  Flu Vaccine: had  Covid Vaccine: 1 dose     Consent (if needed): pt plans for repeat c/s with KRISS      Sterilization/Contraception: tubal; pt advised to complete consent at next visit    OUD Screening: Pt. Has answered NO 5P questions and has NO  risk factors. Pt. Given What pregnant women need to know handout. Educational material reviewed with patient: Prenatal care, nutrition, weight gain recommendations, travel, exercise, intercourse, pregnancy changes, safe medications, pregnancy and work, fetal movement, labor and  labor, warning signs, food safety, tdap, cord blood, breastfeeding, circumcision, and Group B strep. Circ if male. Plans to breastfeed. Blood transfusion if needed: consents  PN labs: 1st trimester labs; early 1 hr gtt    Optional genetic screening labs were reviewed: Shirley Leary, FTS with US, Quad screen MSAFP and CF screening. Pt is interested in NIPT test, pt wants to check with her insurance. CPT codes provided.     [facility] Policy: reviewed    NOB apt: KRISS

## 2023-07-21 ENCOUNTER — LAB ENCOUNTER (OUTPATIENT)
Dept: LAB | Age: 28
End: 2023-07-21
Attending: OBSTETRICS & GYNECOLOGY
Payer: MEDICAID

## 2023-07-21 DIAGNOSIS — Z34.80 SUPERVISION OF OTHER NORMAL PREGNANCY, ANTEPARTUM: ICD-10-CM

## 2023-07-21 LAB
BASOPHILS # BLD AUTO: 0.03 X10(3) UL (ref 0–0.2)
BASOPHILS NFR BLD AUTO: 0.2 %
BILIRUB UR QL: NEGATIVE
CLARITY UR: CLEAR
COLOR UR: COLORLESS
DEPRECATED RDW RBC AUTO: 37.4 FL (ref 35.1–46.3)
EOSINOPHIL # BLD AUTO: 0.13 X10(3) UL (ref 0–0.7)
EOSINOPHIL NFR BLD AUTO: 1 %
ERYTHROCYTE [DISTWIDTH] IN BLOOD BY AUTOMATED COUNT: 12.8 % (ref 11–15)
GLUCOSE UR-MCNC: NORMAL MG/DL
HCT VFR BLD AUTO: 36.7 %
HGB BLD-MCNC: 12.7 G/DL
HGB UR QL STRIP.AUTO: NEGATIVE
IMM GRANULOCYTES # BLD AUTO: 0.06 X10(3) UL (ref 0–1)
IMM GRANULOCYTES NFR BLD: 0.5 %
LEUKOCYTE ESTERASE UR QL STRIP.AUTO: NEGATIVE
LYMPHOCYTES # BLD AUTO: 1.92 X10(3) UL (ref 1–4)
LYMPHOCYTES NFR BLD AUTO: 15.4 %
MCH RBC QN AUTO: 28.2 PG (ref 26–34)
MCHC RBC AUTO-ENTMCNC: 34.6 G/DL (ref 31–37)
MCV RBC AUTO: 81.6 FL
MONOCYTES # BLD AUTO: 0.86 X10(3) UL (ref 0.1–1)
MONOCYTES NFR BLD AUTO: 6.9 %
NEUTROPHILS # BLD AUTO: 9.47 X10 (3) UL (ref 1.5–7.7)
NEUTROPHILS # BLD AUTO: 9.47 X10(3) UL (ref 1.5–7.7)
NEUTROPHILS NFR BLD AUTO: 76 %
NITRITE UR QL STRIP.AUTO: NEGATIVE
PH UR: 6.5 [PH] (ref 5–8)
PLATELET # BLD AUTO: 235 10(3)UL (ref 150–450)
PROT UR-MCNC: NEGATIVE MG/DL
RBC # BLD AUTO: 4.5 X10(6)UL
SP GR UR STRIP: 1.01 (ref 1–1.03)
UROBILINOGEN UR STRIP-ACNC: NORMAL
WBC # BLD AUTO: 12.5 X10(3) UL (ref 4–11)

## 2023-07-21 PROCEDURE — 86762 RUBELLA ANTIBODY: CPT

## 2023-07-21 PROCEDURE — 86803 HEPATITIS C AB TEST: CPT

## 2023-07-21 PROCEDURE — 36415 COLL VENOUS BLD VENIPUNCTURE: CPT

## 2023-07-21 PROCEDURE — 86901 BLOOD TYPING SEROLOGIC RH(D): CPT

## 2023-07-21 PROCEDURE — 87389 HIV-1 AG W/HIV-1&-2 AB AG IA: CPT

## 2023-07-21 PROCEDURE — 85025 COMPLETE CBC W/AUTO DIFF WBC: CPT

## 2023-07-21 PROCEDURE — 86780 TREPONEMA PALLIDUM: CPT

## 2023-07-21 PROCEDURE — 87086 URINE CULTURE/COLONY COUNT: CPT

## 2023-07-21 PROCEDURE — 87340 HEPATITIS B SURFACE AG IA: CPT

## 2023-07-21 PROCEDURE — 86900 BLOOD TYPING SEROLOGIC ABO: CPT

## 2023-07-21 PROCEDURE — 82728 ASSAY OF FERRITIN: CPT

## 2023-07-21 PROCEDURE — 86850 RBC ANTIBODY SCREEN: CPT

## 2023-07-22 LAB
ANTIBODY SCREEN: NEGATIVE
DEPRECATED HBV CORE AB SER IA-ACNC: 33.5 NG/ML
HBV SURFACE AG SER-ACNC: <0.1 [IU]/L
HBV SURFACE AG SERPL QL IA: NONREACTIVE
HCV AB SERPL QL IA: NONREACTIVE
RH BLOOD TYPE: POSITIVE
RUBV IGG SER QL: POSITIVE
RUBV IGG SER-ACNC: 58.5 IU/ML (ref 10–?)

## 2023-07-24 LAB — T PALLIDUM AB SER QL: NEGATIVE

## 2023-07-29 ENCOUNTER — LAB ENCOUNTER (OUTPATIENT)
Dept: LAB | Age: 28
End: 2023-07-29
Attending: OBSTETRICS & GYNECOLOGY
Payer: MEDICAID

## 2023-07-29 DIAGNOSIS — O09.299 HISTORY OF MACROSOMIA IN INFANT IN PRIOR PREGNANCY, CURRENTLY PREGNANT: ICD-10-CM

## 2023-07-29 DIAGNOSIS — Z34.80 SUPERVISION OF OTHER NORMAL PREGNANCY, ANTEPARTUM: ICD-10-CM

## 2023-07-29 LAB — GLUCOSE 1H P GLC SERPL-MCNC: 162 MG/DL

## 2023-07-29 PROCEDURE — 36415 COLL VENOUS BLD VENIPUNCTURE: CPT

## 2023-07-29 PROCEDURE — 82950 GLUCOSE TEST: CPT

## 2023-08-05 ENCOUNTER — TELEPHONE (OUTPATIENT)
Dept: OBGYN CLINIC | Facility: CLINIC | Age: 28
End: 2023-08-05

## 2023-08-05 DIAGNOSIS — R73.09 ELEVATED GLUCOSE: Primary | ICD-10-CM

## 2023-08-14 ENCOUNTER — INITIAL PRENATAL (OUTPATIENT)
Dept: OBGYN CLINIC | Facility: CLINIC | Age: 28
End: 2023-08-14

## 2023-08-14 VITALS — DIASTOLIC BLOOD PRESSURE: 62 MMHG | SYSTOLIC BLOOD PRESSURE: 120 MMHG | WEIGHT: 168.63 LBS | BODY MASS INDEX: 34 KG/M2

## 2023-08-14 DIAGNOSIS — Z98.891 HISTORY OF CESAREAN SECTION: ICD-10-CM

## 2023-08-14 DIAGNOSIS — Z34.81 ENCOUNTER FOR SUPERVISION OF OTHER NORMAL PREGNANCY IN FIRST TRIMESTER: Primary | ICD-10-CM

## 2023-08-14 PROCEDURE — 0500F INITIAL PRENATAL CARE VISIT: CPT | Performed by: OBSTETRICS & GYNECOLOGY

## 2023-08-14 PROCEDURE — 3074F SYST BP LT 130 MM HG: CPT | Performed by: OBSTETRICS & GYNECOLOGY

## 2023-08-14 PROCEDURE — 3078F DIAST BP <80 MM HG: CPT | Performed by: OBSTETRICS & GYNECOLOGY

## 2023-08-14 NOTE — PROGRESS NOTES
Early 1 hr ob 162,  3 hr gtt has been recommended,  order in epic, pt declines genetic testing. To schedule level 1 ultrasound with mfm,  order entered.

## 2023-08-15 LAB
C TRACH DNA SPEC QL NAA+PROBE: NEGATIVE
N GONORRHOEA DNA SPEC QL NAA+PROBE: NEGATIVE

## 2023-08-19 ENCOUNTER — LABORATORY ENCOUNTER (OUTPATIENT)
Dept: LAB | Facility: HOSPITAL | Age: 28
End: 2023-08-19
Attending: OBSTETRICS & GYNECOLOGY
Payer: MEDICAID

## 2023-08-19 DIAGNOSIS — R73.09 ELEVATED GLUCOSE: ICD-10-CM

## 2023-08-19 LAB
GLUCOSE 1H P GLC SERPL-MCNC: 164 MG/DL
GLUCOSE 2H P GLC SERPL-MCNC: 113 MG/DL
GLUCOSE 3H P GLC SERPL-MCNC: 140 MG/DL (ref 70–140)
GLUCOSE P FAST SERPL-MCNC: 96 MG/DL

## 2023-08-19 PROCEDURE — 82952 GTT-ADDED SAMPLES: CPT

## 2023-08-19 PROCEDURE — 36415 COLL VENOUS BLD VENIPUNCTURE: CPT

## 2023-08-19 PROCEDURE — 82951 GLUCOSE TOLERANCE TEST (GTT): CPT

## 2023-08-24 ENCOUNTER — TELEPHONE (OUTPATIENT)
Dept: OBGYN CLINIC | Facility: CLINIC | Age: 28
End: 2023-08-24

## 2023-08-24 DIAGNOSIS — O24.419 GESTATIONAL DIABETES MELLITUS (GDM), ANTEPARTUM, GESTATIONAL DIABETES METHOD OF CONTROL UNSPECIFIED: Primary | ICD-10-CM

## 2023-08-24 NOTE — TELEPHONE ENCOUNTER
Pt informed of 3 hr glucose tolerance test result and ASJ recommendation. Diabetes Education order placed.

## 2023-08-28 ENCOUNTER — APPOINTMENT (OUTPATIENT)
Dept: ENDOCRINOLOGY | Facility: HOSPITAL | Age: 28
End: 2023-08-28
Attending: OBSTETRICS & GYNECOLOGY
Payer: MEDICAID

## 2023-08-29 ENCOUNTER — TELEPHONE (OUTPATIENT)
Dept: PERINATAL CARE | Facility: HOSPITAL | Age: 28
End: 2023-08-29

## 2023-09-15 ENCOUNTER — HOSPITAL ENCOUNTER (OUTPATIENT)
Dept: PERINATAL CARE | Facility: HOSPITAL | Age: 28
Discharge: HOME | End: 2023-09-15
Attending: OBSTETRICS & GYNECOLOGY
Payer: MEDICAID

## 2023-09-15 ENCOUNTER — LAB ENCOUNTER (OUTPATIENT)
Dept: LAB | Facility: HOSPITAL | Age: 28
End: 2023-09-15
Attending: OBSTETRICS & GYNECOLOGY
Payer: MEDICAID

## 2023-09-15 ENCOUNTER — ROUTINE PRENATAL (OUTPATIENT)
Dept: OBGYN CLINIC | Facility: CLINIC | Age: 28
End: 2023-09-15

## 2023-09-15 VITALS
BODY MASS INDEX: 35 KG/M2 | HEART RATE: 96 BPM | DIASTOLIC BLOOD PRESSURE: 62 MMHG | WEIGHT: 173 LBS | SYSTOLIC BLOOD PRESSURE: 118 MMHG

## 2023-09-15 VITALS
WEIGHT: 173 LBS | SYSTOLIC BLOOD PRESSURE: 105 MMHG | HEART RATE: 102 BPM | BODY MASS INDEX: 35 KG/M2 | DIASTOLIC BLOOD PRESSURE: 69 MMHG

## 2023-09-15 DIAGNOSIS — O24.419 GESTATIONAL DIABETES MELLITUS (GDM), ANTEPARTUM, GESTATIONAL DIABETES METHOD OF CONTROL UNSPECIFIED: ICD-10-CM

## 2023-09-15 DIAGNOSIS — O24.419 GESTATIONAL DIABETES MELLITUS (GDM) IN SECOND TRIMESTER, GESTATIONAL DIABETES METHOD OF CONTROL UNSPECIFIED: ICD-10-CM

## 2023-09-15 DIAGNOSIS — Z34.82 ENCOUNTER FOR SUPERVISION OF OTHER NORMAL PREGNANCY IN SECOND TRIMESTER: ICD-10-CM

## 2023-09-15 DIAGNOSIS — O99.212 OBESITY AFFECTING PREGNANCY IN SECOND TRIMESTER, UNSPECIFIED OBESITY TYPE: Primary | ICD-10-CM

## 2023-09-15 DIAGNOSIS — O24.410 DIET CONTROLLED GESTATIONAL DIABETES MELLITUS (GDM) IN SECOND TRIMESTER: ICD-10-CM

## 2023-09-15 DIAGNOSIS — O24.410 DIET CONTROLLED GESTATIONAL DIABETES MELLITUS (GDM) IN SECOND TRIMESTER: Primary | ICD-10-CM

## 2023-09-15 DIAGNOSIS — Z98.891 HISTORY OF 2 CESAREAN SECTIONS: ICD-10-CM

## 2023-09-15 DIAGNOSIS — O24.414 INSULIN CONTROLLED GESTATIONAL DIABETES MELLITUS (GDM) IN SECOND TRIMESTER: ICD-10-CM

## 2023-09-15 DIAGNOSIS — O99.212 OBESITY AFFECTING PREGNANCY IN SECOND TRIMESTER, UNSPECIFIED OBESITY TYPE: ICD-10-CM

## 2023-09-15 PROBLEM — E66.9 OBESITY (BMI 30.0-34.9): Status: ACTIVE | Noted: 2023-09-15

## 2023-09-15 PROBLEM — E66.811 OBESITY (BMI 30.0-34.9): Status: ACTIVE | Noted: 2023-09-15

## 2023-09-15 PROBLEM — R87.612 PAPANICOLAOU SMEAR OF CERVIX WITH LOW GRADE SQUAMOUS INTRAEPITHELIAL LESION (LGSIL): Status: RESOLVED | Noted: 2019-04-03 | Resolved: 2023-09-15

## 2023-09-15 PROBLEM — R87.612 LGSIL OF CERVIX OF UNDETERMINED SIGNIFICANCE: Status: RESOLVED | Noted: 2019-11-14 | Resolved: 2023-09-15

## 2023-09-15 LAB
APPEARANCE: CLEAR
BILIRUBIN: NEGATIVE
EST. AVERAGE GLUCOSE BLD GHB EST-MCNC: 94 MG/DL (ref 68–126)
GLUCOSE (URINE DIPSTICK): NEGATIVE MG/DL
HBA1C MFR BLD: 4.9 % (ref ?–5.7)
KETONES (URINE DIPSTICK): NEGATIVE MG/DL
LEUKOCYTES: NEGATIVE
MULTISTIX LOT#: NORMAL NUMERIC
NITRITE, URINE: NEGATIVE
OCCULT BLOOD: NEGATIVE
PH, URINE: 7.5 (ref 4.5–8)
PROTEIN (URINE DIPSTICK): NEGATIVE MG/DL
SPECIFIC GRAVITY: 1.01 (ref 1–1.03)
URINE-COLOR: YELLOW
UROBILINOGEN,SEMI-QN: 0.2 MG/DL (ref 0–1.9)

## 2023-09-15 PROCEDURE — 81002 URINALYSIS NONAUTO W/O SCOPE: CPT | Performed by: OBSTETRICS & GYNECOLOGY

## 2023-09-15 PROCEDURE — 3074F SYST BP LT 130 MM HG: CPT | Performed by: OBSTETRICS & GYNECOLOGY

## 2023-09-15 PROCEDURE — 0502F SUBSEQUENT PRENATAL CARE: CPT | Performed by: OBSTETRICS & GYNECOLOGY

## 2023-09-15 PROCEDURE — 76811 OB US DETAILED SNGL FETUS: CPT | Performed by: OBSTETRICS & GYNECOLOGY

## 2023-09-15 PROCEDURE — 83036 HEMOGLOBIN GLYCOSYLATED A1C: CPT

## 2023-09-15 PROCEDURE — 3078F DIAST BP <80 MM HG: CPT | Performed by: OBSTETRICS & GYNECOLOGY

## 2023-09-15 PROCEDURE — 36415 COLL VENOUS BLD VENIPUNCTURE: CPT

## 2023-09-15 PROCEDURE — 82105 ALPHA-FETOPROTEIN SERUM: CPT

## 2023-09-16 LAB
AFP MOM: 0.91
AFP VALUE: 62.1 NG/ML
GA ON COLL DATE: 21.9 WEEKS
INSULIN DEP AFP: NO
MAT AGE AT EDD: 27.1 YR
MULTIPLE GEST AFP: NO
OSBR RISK 1 IN AFP: NORMAL
WEIGHT AFP: 173 LBS

## 2023-09-21 LAB
GESTATIONAL AGE > OR = 9W:: YES
MONOSOMY X (TURNER SYNDROME): NOT DETECTED
TEST RESULT: NEGATIVE
TRISOMY 13 (PATAU SYNDROME): NEGATIVE
TRISOMY 18 (EDWARDS SYNDROME): NEGATIVE
TRISOMY 21 (DOWN SYNDROME): NEGATIVE
XXX (TRIPLE X SYNDROME): NOT DETECTED
XXY (KLINEFELTER SYNDROME): NOT DETECTED
XYY (JACOBS SYNDROME): NOT DETECTED

## 2023-09-29 ENCOUNTER — ROUTINE PRENATAL (OUTPATIENT)
Dept: OBGYN CLINIC | Facility: CLINIC | Age: 28
End: 2023-09-29

## 2023-09-29 VITALS
BODY MASS INDEX: 34 KG/M2 | WEIGHT: 169 LBS | HEART RATE: 90 BPM | DIASTOLIC BLOOD PRESSURE: 70 MMHG | SYSTOLIC BLOOD PRESSURE: 106 MMHG

## 2023-09-29 DIAGNOSIS — Z34.82 ENCOUNTER FOR SUPERVISION OF OTHER NORMAL PREGNANCY IN SECOND TRIMESTER: ICD-10-CM

## 2023-09-29 DIAGNOSIS — O24.410 DIET CONTROLLED GESTATIONAL DIABETES MELLITUS (GDM) IN SECOND TRIMESTER: Primary | ICD-10-CM

## 2023-09-29 LAB
APPEARANCE: CLEAR
BILIRUBIN: NEGATIVE
GLUCOSE (URINE DIPSTICK): NEGATIVE MG/DL
KETONES (URINE DIPSTICK): >=160 MG/DL
LEUKOCYTES: NEGATIVE
MULTISTIX LOT#: ABNORMAL NUMERIC
NITRITE, URINE: NEGATIVE
OCCULT BLOOD: NEGATIVE
PH, URINE: 6.5 (ref 4.5–8)
PROTEIN (URINE DIPSTICK): NEGATIVE MG/DL
SPECIFIC GRAVITY: 1.02 (ref 1–1.03)
URINE-COLOR: YELLOW
UROBILINOGEN,SEMI-QN: 0.2 MG/DL (ref 0–1.9)

## 2023-09-29 PROCEDURE — 0502F SUBSEQUENT PRENATAL CARE: CPT | Performed by: OBSTETRICS & GYNECOLOGY

## 2023-09-29 PROCEDURE — 81003 URINALYSIS AUTO W/O SCOPE: CPT | Performed by: OBSTETRICS & GYNECOLOGY

## 2023-09-29 PROCEDURE — 3074F SYST BP LT 130 MM HG: CPT | Performed by: OBSTETRICS & GYNECOLOGY

## 2023-09-29 PROCEDURE — 3078F DIAST BP <80 MM HG: CPT | Performed by: OBSTETRICS & GYNECOLOGY

## 2023-09-29 PROCEDURE — 90686 IIV4 VACC NO PRSV 0.5 ML IM: CPT | Performed by: OBSTETRICS & GYNECOLOGY

## 2023-09-29 PROCEDURE — 90471 IMMUNIZATION ADMIN: CPT | Performed by: OBSTETRICS & GYNECOLOGY

## 2023-09-29 NOTE — PROGRESS NOTES
Pt checking bs qid.  Forgot bs  Fasting per pt 92 or 93  2 hour post meals 102,103 per pt  Fluvax today

## 2023-10-27 ENCOUNTER — ROUTINE PRENATAL (OUTPATIENT)
Dept: OBGYN CLINIC | Facility: CLINIC | Age: 28
End: 2023-10-27

## 2023-10-27 VITALS — SYSTOLIC BLOOD PRESSURE: 124 MMHG | DIASTOLIC BLOOD PRESSURE: 62 MMHG | WEIGHT: 175.38 LBS | BODY MASS INDEX: 35 KG/M2

## 2023-10-27 DIAGNOSIS — Z34.83 ENCOUNTER FOR SUPERVISION OF OTHER NORMAL PREGNANCY IN THIRD TRIMESTER: ICD-10-CM

## 2023-10-27 DIAGNOSIS — O24.410 DIET CONTROLLED GESTATIONAL DIABETES MELLITUS (GDM) IN SECOND TRIMESTER: Primary | ICD-10-CM

## 2023-10-27 LAB
APPEARANCE: CLEAR
BILIRUBIN: NEGATIVE
GLUCOSE (URINE DIPSTICK): NEGATIVE MG/DL
KETONES (URINE DIPSTICK): 40 MG/DL
LEUKOCYTES: NEGATIVE
MULTISTIX LOT#: ABNORMAL NUMERIC
NITRITE, URINE: NEGATIVE
OCCULT BLOOD: NEGATIVE
PH, URINE: 6.5 (ref 4.5–8)
PROTEIN (URINE DIPSTICK): NEGATIVE MG/DL
SPECIFIC GRAVITY: 1.02 (ref 1–1.03)
URINE-COLOR: YELLOW
UROBILINOGEN,SEMI-QN: 0.2 MG/DL (ref 0–1.9)

## 2023-10-27 PROCEDURE — 90715 TDAP VACCINE 7 YRS/> IM: CPT | Performed by: OBSTETRICS & GYNECOLOGY

## 2023-10-27 PROCEDURE — 81003 URINALYSIS AUTO W/O SCOPE: CPT | Performed by: OBSTETRICS & GYNECOLOGY

## 2023-10-27 PROCEDURE — 3078F DIAST BP <80 MM HG: CPT | Performed by: OBSTETRICS & GYNECOLOGY

## 2023-10-27 PROCEDURE — 99213 OFFICE O/P EST LOW 20 MIN: CPT | Performed by: OBSTETRICS & GYNECOLOGY

## 2023-10-27 PROCEDURE — 3074F SYST BP LT 130 MM HG: CPT | Performed by: OBSTETRICS & GYNECOLOGY

## 2023-10-27 PROCEDURE — 90471 IMMUNIZATION ADMIN: CPT | Performed by: OBSTETRICS & GYNECOLOGY

## 2023-10-27 NOTE — PROGRESS NOTES
Pt forgot bs  She is checking them. Fasting per pt <95. 2 hr pp <120 per pt  Pt wants sterilization and consent stephan. Permanence d/w pt  Tdap today.  3rd tri labs ordered

## 2023-11-09 ENCOUNTER — ROUTINE PRENATAL (OUTPATIENT)
Dept: OBGYN CLINIC | Facility: CLINIC | Age: 28
End: 2023-11-09

## 2023-11-09 VITALS — SYSTOLIC BLOOD PRESSURE: 120 MMHG | WEIGHT: 176 LBS | BODY MASS INDEX: 36 KG/M2 | DIASTOLIC BLOOD PRESSURE: 72 MMHG

## 2023-11-09 DIAGNOSIS — Z34.83 ENCOUNTER FOR SUPERVISION OF OTHER NORMAL PREGNANCY IN THIRD TRIMESTER: ICD-10-CM

## 2023-11-09 DIAGNOSIS — O24.410 DIET CONTROLLED GESTATIONAL DIABETES MELLITUS (GDM) IN THIRD TRIMESTER: Primary | ICD-10-CM

## 2023-11-09 PROBLEM — Z30.09 ENCOUNTER FOR CONSULTATION FOR FEMALE STERILIZATION: Status: ACTIVE | Noted: 2019-09-12

## 2023-11-09 LAB
APPEARANCE: CLEAR
BILIRUBIN: NEGATIVE
GLUCOSE (URINE DIPSTICK): NEGATIVE MG/DL
KETONES (URINE DIPSTICK): NEGATIVE MG/DL
LEUKOCYTES: NEGATIVE
MULTISTIX LOT#: NORMAL NUMERIC
NITRITE, URINE: NEGATIVE
OCCULT BLOOD: NEGATIVE
PH, URINE: 6.5 (ref 4.5–8)
PROTEIN (URINE DIPSTICK): NEGATIVE MG/DL
SPECIFIC GRAVITY: 1.02 (ref 1–1.03)
URINE-COLOR: YELLOW
UROBILINOGEN,SEMI-QN: 0.2 MG/DL (ref 0–1.9)

## 2023-11-09 PROCEDURE — 81002 URINALYSIS NONAUTO W/O SCOPE: CPT | Performed by: OBSTETRICS & GYNECOLOGY

## 2023-11-09 PROCEDURE — 3074F SYST BP LT 130 MM HG: CPT | Performed by: OBSTETRICS & GYNECOLOGY

## 2023-11-09 PROCEDURE — 99212 OFFICE O/P EST SF 10 MIN: CPT | Performed by: OBSTETRICS & GYNECOLOGY

## 2023-11-09 PROCEDURE — 3078F DIAST BP <80 MM HG: CPT | Performed by: OBSTETRICS & GYNECOLOGY

## 2023-11-29 ENCOUNTER — LAB ENCOUNTER (OUTPATIENT)
Dept: LAB | Age: 28
End: 2023-11-29
Attending: OBSTETRICS & GYNECOLOGY
Payer: MEDICAID

## 2023-11-29 DIAGNOSIS — O24.410 DIET CONTROLLED GESTATIONAL DIABETES MELLITUS (GDM) IN SECOND TRIMESTER: ICD-10-CM

## 2023-11-29 LAB
EST. AVERAGE GLUCOSE BLD GHB EST-MCNC: 105 MG/DL (ref 68–126)
HBA1C MFR BLD: 5.3 % (ref ?–5.7)

## 2023-11-29 PROCEDURE — 86780 TREPONEMA PALLIDUM: CPT

## 2023-11-29 PROCEDURE — 87389 HIV-1 AG W/HIV-1&-2 AB AG IA: CPT

## 2023-11-29 PROCEDURE — 83036 HEMOGLOBIN GLYCOSYLATED A1C: CPT

## 2023-11-29 PROCEDURE — 36415 COLL VENOUS BLD VENIPUNCTURE: CPT

## 2023-12-01 LAB — T PALLIDUM AB SER QL: NEGATIVE

## 2023-12-04 ENCOUNTER — ROUTINE PRENATAL (OUTPATIENT)
Dept: OBGYN CLINIC | Facility: CLINIC | Age: 28
End: 2023-12-04
Payer: MEDICAID

## 2023-12-04 VITALS — SYSTOLIC BLOOD PRESSURE: 110 MMHG | WEIGHT: 177.81 LBS | DIASTOLIC BLOOD PRESSURE: 58 MMHG | BODY MASS INDEX: 36 KG/M2

## 2023-12-04 DIAGNOSIS — Z34.83 ENCOUNTER FOR SUPERVISION OF OTHER NORMAL PREGNANCY IN THIRD TRIMESTER: Primary | ICD-10-CM

## 2023-12-04 LAB
APPEARANCE: CLEAR
BILIRUBIN: NEGATIVE
GLUCOSE (URINE DIPSTICK): NEGATIVE MG/DL
KETONES (URINE DIPSTICK): NEGATIVE MG/DL
LEUKOCYTES: NEGATIVE
MULTISTIX LOT#: NORMAL NUMERIC
NITRITE, URINE: NEGATIVE
OCCULT BLOOD: NEGATIVE
PH, URINE: 6.5 (ref 4.5–8)
PROTEIN (URINE DIPSTICK): NEGATIVE MG/DL
SPECIFIC GRAVITY: 1.01 (ref 1–1.03)
URINE-COLOR: YELLOW
UROBILINOGEN,SEMI-QN: 0.2 MG/DL (ref 0–1.9)

## 2023-12-06 ENCOUNTER — HOSPITAL ENCOUNTER (OUTPATIENT)
Dept: PERINATAL CARE | Facility: HOSPITAL | Age: 28
Discharge: HOME | End: 2023-12-06
Attending: OBSTETRICS & GYNECOLOGY
Payer: MEDICAID

## 2023-12-06 VITALS
BODY MASS INDEX: 36 KG/M2 | DIASTOLIC BLOOD PRESSURE: 71 MMHG | WEIGHT: 177 LBS | HEART RATE: 96 BPM | SYSTOLIC BLOOD PRESSURE: 114 MMHG

## 2023-12-06 DIAGNOSIS — E66.9 OBESITY (BMI 30.0-34.9): ICD-10-CM

## 2023-12-06 DIAGNOSIS — Z98.891 HISTORY OF 2 CESAREAN SECTIONS: ICD-10-CM

## 2023-12-06 DIAGNOSIS — O24.410 DIET CONTROLLED GESTATIONAL DIABETES MELLITUS (GDM) IN SECOND TRIMESTER: ICD-10-CM

## 2023-12-06 DIAGNOSIS — O24.410 DIET CONTROLLED GESTATIONAL DIABETES MELLITUS (GDM) IN THIRD TRIMESTER: ICD-10-CM

## 2023-12-06 DIAGNOSIS — O24.410 DIET CONTROLLED GESTATIONAL DIABETES MELLITUS (GDM) IN SECOND TRIMESTER: Primary | ICD-10-CM

## 2023-12-06 PROCEDURE — 76816 OB US FOLLOW-UP PER FETUS: CPT | Performed by: OBSTETRICS & GYNECOLOGY

## 2023-12-06 PROCEDURE — 76819 FETAL BIOPHYS PROFIL W/O NST: CPT

## 2023-12-17 ENCOUNTER — HOSPITAL ENCOUNTER (OUTPATIENT)
Facility: HOSPITAL | Age: 28
Discharge: HOME | End: 2023-12-17
Attending: OBSTETRICS & GYNECOLOGY | Admitting: OBSTETRICS & GYNECOLOGY
Payer: MEDICAID

## 2023-12-17 VITALS — HEART RATE: 95 BPM | DIASTOLIC BLOOD PRESSURE: 56 MMHG | SYSTOLIC BLOOD PRESSURE: 97 MMHG

## 2023-12-17 LAB
ALBUMIN SERPL-MCNC: 3.8 G/DL (ref 3.2–4.8)
ALBUMIN/GLOB SERPL: 1.3 {RATIO} (ref 1–2)
ALP LIVER SERPL-CCNC: 117 U/L
ALT SERPL-CCNC: <7 U/L
ANION GAP SERPL CALC-SCNC: 6 MMOL/L (ref 0–18)
AST SERPL-CCNC: 11 U/L (ref ?–34)
BILIRUB SERPL-MCNC: 0.3 MG/DL (ref 0.3–1.2)
BUN BLD-MCNC: 5 MG/DL (ref 9–23)
BUN/CREAT SERPL: 10.9 (ref 10–20)
CALCIUM BLD-MCNC: 8.7 MG/DL (ref 8.7–10.4)
CHLORIDE SERPL-SCNC: 110 MMOL/L (ref 98–112)
CO2 SERPL-SCNC: 20 MMOL/L (ref 21–32)
CREAT BLD-MCNC: 0.46 MG/DL
EGFRCR SERPLBLD CKD-EPI 2021: 134 ML/MIN/1.73M2 (ref 60–?)
FASTING STATUS PATIENT QL REPORTED: NO
GLOBULIN PLAS-MCNC: 2.9 G/DL (ref 2.8–4.4)
GLUCOSE BLD-MCNC: 98 MG/DL (ref 70–99)
OSMOLALITY SERPL CALC.SUM OF ELEC: 279 MOSM/KG (ref 275–295)
POTASSIUM SERPL-SCNC: 3.6 MMOL/L (ref 3.5–5.1)
PROT SERPL-MCNC: 6.7 G/DL (ref 5.7–8.2)
SODIUM SERPL-SCNC: 136 MMOL/L (ref 136–145)

## 2023-12-17 PROCEDURE — 99213 OFFICE O/P EST LOW 20 MIN: CPT

## 2023-12-17 PROCEDURE — 80053 COMPREHEN METABOLIC PANEL: CPT | Performed by: OBSTETRICS & GYNECOLOGY

## 2023-12-17 PROCEDURE — 59025 FETAL NON-STRESS TEST: CPT

## 2023-12-17 PROCEDURE — 82239 BILE ACIDS TOTAL: CPT | Performed by: OBSTETRICS & GYNECOLOGY

## 2023-12-17 PROCEDURE — 36415 COLL VENOUS BLD VENIPUNCTURE: CPT

## 2023-12-17 RX ORDER — DIPHENHYDRAMINE HCL 25 MG
50 CAPSULE ORAL ONCE
Status: COMPLETED | OUTPATIENT
Start: 2023-12-17 | End: 2023-12-17

## 2023-12-17 NOTE — TRIAGE
[facility]      Triage Note    [address] Patient Status:  Outpatient    1995 MRN [id number]   Location [address] Attending Arlene Kaiser MD   [facility] Day # 0 [facility] Annette [address], LAZARO [place]: [id number]  Estimated Date of Delivery: 24  Gestation: 35w1d    Chief Complaint     Assessment         Allergies:     Allergies   Allergen Reactions    Penicillins HIVES       Orders Placed This Encounter   Procedures    Comp Metabolic Panel (14)    Bile Acids       Lab Results   Component Value Date    WBC 12.5 (H) 2023    HGB 12.7 2023    HCT 36.7 2023    .0 2023    CREATSERUM 0.39 (L) 2019    BUN 5 (L) 2019     2019    K 3.5 2019     2019    CO2 21.0 2019     (H) 2019    CA 8.8 2019    ALB 2.7 (L) 2019    ALKPHO 83 2019    BILT 0.2 2019    TP 7.2 2019    AST 7 (L) 2019    ALT 12 (L) 2019    TSH 1.710 2019    LIP 94 2019    DDIMER >4.00 (H) 2019    TROP <0.045 2019       Clinitek UA  Lab Results   Component Value Date    GLUUR Normal 2023    SPECGRAVITY 1.010 2023    URINECUL No Growth at 18-24 hrs. 2023       UA  Lab Results   Component Value Date    COLORUR Colorless (A) 2023    CLARITY Clear 2023    SPECGRAVITY 1.010 2023    PROUR Negative 2023    GLUUR Normal 2023    KETUR Trace (A) 2023    BILUR Negative 2023    BLOODURINE Negative 2023    NITRITE Negative 2023    UROBILINOGEN Normal 2023    LEUUR Negative 2023    UASA 20 (A) 2019       Vitals:    23 0431   BP: 97/56   Pulse: 95       NST                                                                                                                                                         Additional Comments       Chief Complaint Patient presents with     Assessment     Patient reports increased itchiness in hands that has been going on for 2 weeks but has become increasing worse with constant itchiness. Pt reports feeling extremely itchy on hands and on feet and unable to sleep. MD called and orders given to draw CMP and Bile acids. EFM category 1 tracing. If liver enzymes normal then ok to discharge home and encourage antihistamines while awaiting bile acid results. Liver enzymes normal. Pt to discharge home. Written and verbal discharge instructions given. Patient agreeable to plan.      Nazanin Shi RN  2023 4:58 AM

## 2023-12-17 NOTE — PROGRESS NOTES
Pt is a 33year old female admitted to TR1/TR1-A. Chief Complaint   Patient presents with     Assessment     Patient reports increased itchiness in hands that has been going on for 2 weeks but has become increasing worse with constant itchiness. Pt is [id number] 35w1d intra-uterine pregnancy. History obtained, consents signed. Oriented to room, staff, and plan of care.

## 2023-12-18 ENCOUNTER — ROUTINE PRENATAL (OUTPATIENT)
Dept: OBGYN CLINIC | Facility: CLINIC | Age: 28
End: 2023-12-18

## 2023-12-18 VITALS — DIASTOLIC BLOOD PRESSURE: 64 MMHG | WEIGHT: 176.38 LBS | SYSTOLIC BLOOD PRESSURE: 120 MMHG | BODY MASS INDEX: 36 KG/M2

## 2023-12-18 DIAGNOSIS — Z34.83 ENCOUNTER FOR SUPERVISION OF OTHER NORMAL PREGNANCY IN THIRD TRIMESTER: Primary | ICD-10-CM

## 2023-12-18 LAB
APPEARANCE: CLEAR
BILIRUBIN: NEGATIVE
GLUCOSE (URINE DIPSTICK): NEGATIVE MG/DL
KETONES (URINE DIPSTICK): NEGATIVE MG/DL
MULTISTIX LOT#: ABNORMAL NUMERIC
NITRITE, URINE: NEGATIVE
OCCULT BLOOD: NEGATIVE
PH, URINE: 7 (ref 4.5–8)
PROTEIN (URINE DIPSTICK): NEGATIVE MG/DL
SPECIFIC GRAVITY: 1.02 (ref 1–1.03)
URINE-COLOR: YELLOW
UROBILINOGEN,SEMI-QN: 0.2 MG/DL (ref 0–1.9)

## 2023-12-18 PROCEDURE — 3078F DIAST BP <80 MM HG: CPT | Performed by: OBSTETRICS & GYNECOLOGY

## 2023-12-18 PROCEDURE — 99212 OFFICE O/P EST SF 10 MIN: CPT | Performed by: OBSTETRICS & GYNECOLOGY

## 2023-12-18 PROCEDURE — 81003 URINALYSIS AUTO W/O SCOPE: CPT | Performed by: OBSTETRICS & GYNECOLOGY

## 2023-12-18 PROCEDURE — 3074F SYST BP LT 130 MM HG: CPT | Performed by: OBSTETRICS & GYNECOLOGY

## 2023-12-19 LAB — BILE ACIDS: 3.7 UMOL/L

## 2023-12-22 ENCOUNTER — PATIENT MESSAGE (OUTPATIENT)
Dept: OBGYN CLINIC | Facility: CLINIC | Age: 28
End: 2023-12-22

## 2023-12-26 NOTE — TELEPHONE ENCOUNTER
Pt informed ASJ is out of office. Informed her to discuss scheduling at routine PN visit. Scheduled to see LIBRA tomorrow, 12/27.

## 2023-12-27 ENCOUNTER — ROUTINE PRENATAL (OUTPATIENT)
Dept: OBGYN CLINIC | Facility: CLINIC | Age: 28
End: 2023-12-27
Payer: MEDICAID

## 2023-12-27 ENCOUNTER — TELEPHONE (OUTPATIENT)
Dept: OBGYN CLINIC | Facility: CLINIC | Age: 28
End: 2023-12-27

## 2023-12-27 VITALS
BODY MASS INDEX: 35 KG/M2 | HEART RATE: 125 BPM | WEIGHT: 175.63 LBS | DIASTOLIC BLOOD PRESSURE: 71 MMHG | SYSTOLIC BLOOD PRESSURE: 116 MMHG

## 2023-12-27 DIAGNOSIS — Z34.83 ENCOUNTER FOR SUPERVISION OF OTHER NORMAL PREGNANCY IN THIRD TRIMESTER: ICD-10-CM

## 2023-12-27 DIAGNOSIS — O24.410 DIET CONTROLLED GESTATIONAL DIABETES MELLITUS (GDM) IN THIRD TRIMESTER: Primary | ICD-10-CM

## 2023-12-27 LAB
APPEARANCE: CLEAR
BILIRUBIN: NEGATIVE
GLUCOSE (URINE DIPSTICK): NEGATIVE MG/DL
KETONES (URINE DIPSTICK): 80 MG/DL
LEUKOCYTES: NEGATIVE
MULTISTIX LOT#: ABNORMAL NUMERIC
NITRITE, URINE: NEGATIVE
OCCULT BLOOD: NEGATIVE
PH, URINE: 6 (ref 4.5–8)
PROTEIN (URINE DIPSTICK): NEGATIVE MG/DL
SPECIFIC GRAVITY: 1.02 (ref 1–1.03)
URINE-COLOR: YELLOW
UROBILINOGEN,SEMI-QN: 0.2 MG/DL (ref 0–1.9)

## 2023-12-27 PROCEDURE — 3074F SYST BP LT 130 MM HG: CPT | Performed by: OBSTETRICS & GYNECOLOGY

## 2023-12-27 PROCEDURE — 3078F DIAST BP <80 MM HG: CPT | Performed by: OBSTETRICS & GYNECOLOGY

## 2023-12-27 PROCEDURE — 81002 URINALYSIS NONAUTO W/O SCOPE: CPT | Performed by: OBSTETRICS & GYNECOLOGY

## 2023-12-27 PROCEDURE — 99213 OFFICE O/P EST LOW 20 MIN: CPT | Performed by: OBSTETRICS & GYNECOLOGY

## 2023-12-27 PROCEDURE — 59025 FETAL NON-STRESS TEST: CPT | Performed by: OBSTETRICS & GYNECOLOGY

## 2023-12-27 PROCEDURE — 76815 OB US LIMITED FETUS(S): CPT | Performed by: OBSTETRICS & GYNECOLOGY

## 2023-12-27 NOTE — PROGRESS NOTES
Pt states all fastings less than 95. I reviewed the bs from  and before and elevated fastings. Pt forgot bs. So no insulin if her fastings normal as she states. Gdma1- for nst/geovani for gdma1 now  Pt c/o stuffy nose and body aches.  Pt advised for covid test.   For gbs today  Pt reaffirms plan for tubal and states signed the consent for tubal   scheduled for   Nst scheduled for 1/3 and 1/10 at 1 pm

## 2023-12-27 NOTE — TELEPHONE ENCOUNTER
Please schedule the following surgery:    Procedure: repeat  with bilateral salpingectomy  Assist: (Y/N or none)   Date: 24  Dx:previous , sterilization  Pre-op appt: (Y/N or n/a)   Admission type: (IN/OUT/OBVS)   Department of discharge(SDS/Floor):   Expected length of stay:   Procedure length time (please enter amount you are requesting):   Recovery time (patients always ask):   Medical Clearance: (Y/N)   Special Requests:     Surgical prophylaxis:        ALL Medicaid/including BCBS community: Tubal/ Hyst form MUST be signed (30 days):     Message to nurses:     Already scheduled for  07:30 per provider discretion    Nst scheduled for 1/3 and 1/10 at 1 pm.

## 2023-12-28 LAB — GROUP B STREP BY PCR FOR PCR OVT: POSITIVE

## 2023-12-29 NOTE — TELEPHONE ENCOUNTER
Msg sent to [facility] to check for an assist.

## 2024-01-03 ENCOUNTER — HOSPITAL ENCOUNTER (OUTPATIENT)
Facility: HOSPITAL | Age: 29
Discharge: HOME | End: 2024-01-03
Attending: OBSTETRICS & GYNECOLOGY | Admitting: OBSTETRICS & GYNECOLOGY
Payer: MEDICAID

## 2024-01-03 ENCOUNTER — APPOINTMENT (OUTPATIENT)
Dept: OBGYN CLINIC | Facility: HOSPITAL | Age: 29
End: 2024-01-03
Attending: OBSTETRICS & GYNECOLOGY
Payer: MEDICAID

## 2024-01-03 VITALS — HEART RATE: 92 BPM | SYSTOLIC BLOOD PRESSURE: 113 MMHG | DIASTOLIC BLOOD PRESSURE: 67 MMHG

## 2024-01-03 DIAGNOSIS — O24.410 GDM, CLASS A1: ICD-10-CM

## 2024-01-03 PROCEDURE — 59025 FETAL NON-STRESS TEST: CPT

## 2024-01-03 NOTE — PROGRESS NOTES
Pt is a 28 year old female admitted to TR3/TR3-A.     Chief Complaint   Patient presents with    Non-stress Test     A1GDM       Pt is  37w4d intra-uterine pregnancy.  History obtained, consents signed. Oriented to room, staff, and plan of care.  
5

## 2024-01-05 ENCOUNTER — TELEPHONE (OUTPATIENT)
Dept: OBGYN CLINIC | Facility: CLINIC | Age: 29
End: 2024-01-05

## 2024-01-05 NOTE — TELEPHONE ENCOUNTER
Pt needs to reschedule NST and was told before she can reschedule will need to get providers approval. Please advise

## 2024-01-05 NOTE — TELEPHONE ENCOUNTER
Pt had death in the family. Needs to cancel 01/08/24 NST appointment. Pt rescheduled for 01/10/24 at 8am at the [facility].

## 2024-01-10 ENCOUNTER — HOSPITAL ENCOUNTER (OUTPATIENT)
Facility: HOSPITAL | Age: 29
Discharge: HOME | End: 2024-01-10
Attending: OBSTETRICS & GYNECOLOGY | Admitting: OBSTETRICS & GYNECOLOGY
Payer: MEDICAID

## 2024-01-10 ENCOUNTER — ROUTINE PRENATAL (OUTPATIENT)
Dept: OBGYN CLINIC | Facility: CLINIC | Age: 29
End: 2024-01-10
Payer: MEDICAID

## 2024-01-10 VITALS
SYSTOLIC BLOOD PRESSURE: 110 MMHG | DIASTOLIC BLOOD PRESSURE: 63 MMHG | BODY MASS INDEX: 36 KG/M2 | WEIGHT: 176 LBS | HEART RATE: 102 BPM

## 2024-01-10 VITALS — WEIGHT: 178.19 LBS | DIASTOLIC BLOOD PRESSURE: 76 MMHG | BODY MASS INDEX: 36 KG/M2 | SYSTOLIC BLOOD PRESSURE: 112 MMHG

## 2024-01-10 DIAGNOSIS — Z34.83 ENCOUNTER FOR SUPERVISION OF OTHER NORMAL PREGNANCY IN THIRD TRIMESTER: Primary | ICD-10-CM

## 2024-01-10 DIAGNOSIS — Z34.90 PREGNANCY: ICD-10-CM

## 2024-01-10 LAB
APPEARANCE: CLEAR
BILIRUBIN: NEGATIVE
GLUCOSE (URINE DIPSTICK): NEGATIVE MG/DL
KETONES (URINE DIPSTICK): NEGATIVE MG/DL
LEUKOCYTES: NEGATIVE
MULTISTIX LOT#: NORMAL NUMERIC
NITRITE, URINE: NEGATIVE
OCCULT BLOOD: NEGATIVE
PH, URINE: 6 (ref 4.5–8)
PROTEIN (URINE DIPSTICK): NEGATIVE MG/DL
SPECIFIC GRAVITY: 1.01 (ref 1–1.03)
URINE-COLOR: YELLOW
UROBILINOGEN,SEMI-QN: 0.2 MG/DL (ref 0–1.9)

## 2024-01-10 PROCEDURE — 59025 FETAL NON-STRESS TEST: CPT | Performed by: OBSTETRICS & GYNECOLOGY

## 2024-01-10 PROCEDURE — 99214 OFFICE O/P EST MOD 30 MIN: CPT | Performed by: OBSTETRICS & GYNECOLOGY

## 2024-01-10 NOTE — PROGRESS NOTES
[facility]    Triage note    Brooks Trejo Patient Status:  Outpatient    1995 MRN [id number]   Location [facility] Attending Andrea Barclay MD   Hosp Day # 0 PCP Lucía Koo PA-C     Date of Admission:  1/10/2024      HPI:   Brooks Trejo is a 28 year old  female, current EGA of 38w4d with an estimated date of delivery of: Estimated Date of Delivery: 24      Brooks Trejo is being admitted for observation.    Her current obstetrical history is significant for prior , obesity. , GDMA1   Patient reports no complaints and good fetal movement .     Fetal Movement: normal.     History   Obstetric History:   OB History    Para Term  AB Living   4 2 2   1 2   SAB IAB Ectopic Multiple Live Births     1     1      # Outcome Date GA Lbr Carlos/2nd Weight Sex Delivery Anes PTL Lv   4 Current            3 Term 19 39w0d  8 lb 13.5 oz (4.01 kg) M Caesarean Spinal N DARIN      Birth Comments: Time of delivery:8:14 am  Mother's age: 22 years Maternal blood type:O Positive    Hearing Test: Passed Bilateral  CCHD: Passed  TCB 13.00 2019 0401    BILT 10.9 2019 1842      Complications: Group B beta strep +   2 IAB 2017            Term 14 42w0d  8 lb 13 oz (3.997 kg) M CS-LTranv Spinal N       Complications: Temp 100.4 or greater, Meconium     Past Medical History:   Past Medical History:   Diagnosis Date    Anemia     Decorative tattoo     Diet controlled gestational diabetes mellitus (GDM) in second trimester 9/15/2023    H/O  section 2019    Thinking about VTOL.  Not good candidate.  Requesting planned repeat c section @39 weeks Delivered at 41-1/2 weeks gestational age by a low transverse  section for arrest of descent, persistent occiput posterior.  Had chorioamnionitis. Baby 8 lb 6 oz  PREOPERATIVE DIAGNOSES: 1. Postterm pregnancy at 41 2/7ths weeks gestation. 2. Arrest  of descent. 3. Meconium stained fluid. 4. Chorioamnion    Hepatitis B virus infection     Previous  delivery, antepartum condition or complication 2019    Pt counseled on Vaginal Birth After  () option. Discussed risks of  including uterine rupture with increased risks of  MORTALITY or morbidity which can include hypoxic brain injury. Discussed maternal risks that include hemorrhage. Patient counseled on option of repeat  surgery. Pt counseled on risks of repeat  including infection, bleeding, transfusion and     Previous  section 2019     Past Social History:   Past Surgical History:   Procedure Laterality Date           DELIVERY ONLY      D & C       Family History: No family history on file.  Social History:   Social History     Tobacco Use    Smoking status: Never    Smokeless tobacco: Never   Substance Use Topics    Alcohol use: Not Currently     Comment: occasional; not in pregnancy        Allergies/Medications:   Allergies:   Allergies   Allergen Reactions    Penicillins HIVES     Medications:  Medications Prior to Admission   Medication Sig Dispense Refill Last Dose    Prenatal Vit-DSS-Fe Fum-FA (PRENATAL 19) Oral Tab Take by mouth.   2024 at 2100    [facility]. Devices (BREAST PUMP) Does not apply Misc DOUBLE ELECTRIC BREAST PUMP EQUIVALENT TO MEDELA PUMP IN STYLE 1 each 0        Review of Systems:   As documented in HPI    no complaints and good fetal movement    Physical Exam:   Pulse:  [102] 102  BP: (110)/(63) 110/63    Constitutional: alert, appears stated age, and cooperative  Respiratory: clear to auscultation bilaterally  Cardiac: regular rate and rhythm, S1, S2 normal, no murmur, click, rub or gallop  Abdomen: FHT present  Fetal Surveillance:  reactive nst , cat 1  Sterile vaginal exam: deferred      Results:     Lab Results   Component Value Date    TREPONEMALAB Negative 2023    HBVSAG Non-Reactive 10/02/2013     ABO O 2023    RH Positive 2023    WBC 12.5 (H) 2023    HGB 12.7 2023    HCT 36.7 2023    .0 2023    CREATSERUM 0.46 (L) 2023    BUN 5 (L) 2023     2023    K 3.6 2023     2023    CO2 20.0 (L) 2023    GLU 98 2023    CA 8.7 2023    ALB 3.8 2023    ALKPHO 117 (H) 2023    BILT 0.3 2023    TP 6.7 2023    AST 11 2023    ALT <7 (L) 2023    TSH 1.710 2019    LIP 94 2019       Lab Results   Component Value Date    DDIMER >4.00 (H) 2019    TROP <0.045 2019    COLORUR Colorless (A) 2023    CLARITY Clear 2023    SPECGRAVITY 1.025 2023    PROUR Negative 2023    GLUUR Normal 2023    KETUR Trace (A) 2023    BILUR Negative 2023    BLOODURINE Negative 2023    NITRITE Negative 2023    UROBILINOGEN Normal 2023    LEUUR Negative 2023    UASA 20 (A) 2019       No results found.      Assessment/Plan:    Brooks Trejo is at an estimated gestational age of 38w4d with an estimated date of delivery of:  Estimated Date of Delivery: 24    Not in labor.  Obstetrical history significant for gestational DM, prior , obesity.    Admission problem(s):   Pt doing well,  reactive nst. Pt was counseled again today on the risks/benefits/alternatives of a repeat  section and bilateral salpingectomy including the risks of bleeding/infection/damage to internal organs including bowel/bladder/uterus/ovaries/tubes/cervix/vagina/ureters/blood vessels/among others/hemorrhage and blood tranfusions/thromboembolic dz such as dvt/pe/mi/stroke/among other risks/and the pt voiced her understanding and all questions answered.           Risks, benefits, alternatives and possible complications have been discussed in detail with the patient.   Pre-admission, admission, and post admission procedures and  expectations were discussed in detail.    All questions answered, all appropriate consents will be signed at the Hospital. Admission is planned for today.   Continue present management..    Andrea Barclay MD  1/10/2024  9:23 AM

## 2024-01-10 NOTE — PROGRESS NOTES
Pt is a 28 year old female admitted to TR1/TR1-A.     Chief Complaint   Patient presents with    Non-stress Test     Scheduled for NST for obesity and A1GDM      Pt is  38w4d intra-uterine pregnancy.  History obtained, consents signed. Oriented to room, staff, and plan of care.

## 2024-01-10 NOTE — PROGRESS NOTES
Discharged to home per ambulatory in stable condition with written and verbal instructions. Patient verbalizes understanding of information given.

## 2024-01-10 NOTE — PROGRESS NOTES
[facility]  Obstetrics and Gynecology  Prenatal Visit  Willy Carballo PA-C    HPI   Brooks Trejo is a 28 year old.o.  38w4d weeks.  Pt is here for routine prenatal visit. No complaints or concerns.   Denies any regular uterine contractions, spontaneous rupture membranes or vaginal bleeding.  Patient feeling normal fetal movement.    Blood sugar readings per patient have been normal.   OB History     OB History    Para Term  AB Living   4 2 2   1 2   SAB IAB Ectopic Multiple Live Births     1     1      # Outcome Date GA Lbr Carlos/2nd Weight Sex Delivery Anes PTL Lv   4 Current            3 Term 19 39w0d  8 lb 13.5 oz (4.01 kg) M Caesarean Spinal N DARIN      Birth Comments: Time of delivery:8:14 am  Mother's age: 22 years Maternal blood type:O Positive    Hearing Test: Passed Bilateral  CCHD: Passed  TCB 13.00 2019 0401    BILT 10.9 2019 1842      Complications: Group B beta strep +   2 IAB 2017           1 Term 14 42w0d  8 lb 13 oz (3.997 kg) M CS-LTranv Spinal N       Complications: Temp 100.4 or greater, Meconium     Medications     Current Outpatient Medications   Medication Sig Dispense Refill    Misc. Devices (BREAST PUMP) Does not apply Misc DOUBLE ELECTRIC BREAST PUMP EQUIVALENT TO MEDELA PUMP IN STYLE 1 each 0    Prenatal Vit-DSS-Fe Fum-FA (PRENATAL 19) Oral Tab Take by mouth.       Exam   LMP 2023 (Approximate)   FH: 38  FHTs: 140  Assessment   Brooks is a 28 year old female  with viable IUP at 38w4d weeks.  GDMA1    ICD-10-CM    1. Encounter for supervision of other normal pregnancy in third trimester  Z34.83 POC Urinalysis, Manual Dip without microscopy [10674]        Plan   Pt scheduled for  on . Reminded to complete lab work within 72 hours of scheduled time. Pt aware of time she needs to be at hospital.    WILLY CARBALLO PA-C  10:42 AM  1/10/2024

## 2024-01-12 ENCOUNTER — LAB ENCOUNTER (OUTPATIENT)
Dept: LAB | Age: 29
End: 2024-01-12
Attending: ORTHOPAEDIC SURGERY
Payer: MEDICAID

## 2024-01-12 DIAGNOSIS — Z01.818 PREOP TESTING: ICD-10-CM

## 2024-01-12 LAB
ANTIBODY SCREEN: NEGATIVE
BASOPHILS # BLD AUTO: 0.02 X10(3) UL (ref 0–0.2)
BASOPHILS NFR BLD AUTO: 0.2 %
DEPRECATED RDW RBC AUTO: 41.1 FL (ref 35.1–46.3)
EOSINOPHIL # BLD AUTO: 0.08 X10(3) UL (ref 0–0.7)
EOSINOPHIL NFR BLD AUTO: 1 %
ERYTHROCYTE [DISTWIDTH] IN BLOOD BY AUTOMATED COUNT: 15.3 % (ref 11–15)
HCT VFR BLD AUTO: 35.8 %
HGB BLD-MCNC: 12.1 G/DL
IMM GRANULOCYTES # BLD AUTO: 0.02 X10(3) UL (ref 0–1)
IMM GRANULOCYTES NFR BLD: 0.2 %
LYMPHOCYTES # BLD AUTO: 1.38 X10(3) UL (ref 1–4)
LYMPHOCYTES NFR BLD AUTO: 16.5 %
MCH RBC QN AUTO: 25.4 PG (ref 26–34)
MCHC RBC AUTO-ENTMCNC: 33.8 G/DL (ref 31–37)
MCV RBC AUTO: 75.2 FL
MONOCYTES # BLD AUTO: 0.48 X10(3) UL (ref 0.1–1)
MONOCYTES NFR BLD AUTO: 5.7 %
NEUTROPHILS # BLD AUTO: 6.37 X10 (3) UL (ref 1.5–7.7)
NEUTROPHILS # BLD AUTO: 6.37 X10(3) UL (ref 1.5–7.7)
NEUTROPHILS NFR BLD AUTO: 76.4 %
PLATELET # BLD AUTO: 222 10(3)UL (ref 150–450)
RBC # BLD AUTO: 4.76 X10(6)UL
RH BLOOD TYPE: POSITIVE
WBC # BLD AUTO: 8.4 X10(3) UL (ref 4–11)

## 2024-01-12 PROCEDURE — 36415 COLL VENOUS BLD VENIPUNCTURE: CPT | Performed by: OBSTETRICS & GYNECOLOGY

## 2024-01-12 PROCEDURE — 86901 BLOOD TYPING SEROLOGIC RH(D): CPT | Performed by: OBSTETRICS & GYNECOLOGY

## 2024-01-12 PROCEDURE — 86850 RBC ANTIBODY SCREEN: CPT | Performed by: OBSTETRICS & GYNECOLOGY

## 2024-01-12 PROCEDURE — 86900 BLOOD TYPING SEROLOGIC ABO: CPT | Performed by: OBSTETRICS & GYNECOLOGY

## 2024-01-12 PROCEDURE — 85025 COMPLETE CBC W/AUTO DIFF WBC: CPT

## 2024-01-13 ENCOUNTER — HOSPITAL ENCOUNTER (INPATIENT)
Facility: HOSPITAL | Age: 29
LOS: 3 days | Discharge: HOME | End: 2024-01-16
Attending: OBSTETRICS & GYNECOLOGY | Admitting: OBSTETRICS & GYNECOLOGY
Payer: MEDICAID

## 2024-01-13 ENCOUNTER — ANESTHESIA (OUTPATIENT)
Dept: OBGYN UNIT | Facility: HOSPITAL | Age: 29
End: 2024-01-13
Payer: MEDICAID

## 2024-01-13 ENCOUNTER — ANESTHESIA EVENT (OUTPATIENT)
Dept: OBGYN UNIT | Facility: HOSPITAL | Age: 29
End: 2024-01-13
Payer: MEDICAID

## 2024-01-13 PROBLEM — Z34.90 PREGNANCY: Status: ACTIVE | Noted: 2024-01-13

## 2024-01-13 PROBLEM — Z34.90 PREGNANCY (HCC): Status: ACTIVE | Noted: 2024-01-13

## 2024-01-13 LAB — GLUCOSE BLDC GLUCOMTR-MCNC: 86 MG/DL (ref 70–99)

## 2024-01-13 PROCEDURE — 58611 LIGATE OVIDUCT(S) ADD-ON: CPT | Performed by: OBSTETRICS & GYNECOLOGY

## 2024-01-13 PROCEDURE — 59514 CESAREAN DELIVERY ONLY: CPT | Performed by: OBSTETRICS & GYNECOLOGY

## 2024-01-13 PROCEDURE — 0UT70ZZ RESECTION OF BILATERAL FALLOPIAN TUBES, OPEN APPROACH: ICD-10-PCS | Performed by: OBSTETRICS & GYNECOLOGY

## 2024-01-13 RX ORDER — CLINDAMYCIN PHOSPHATE 900 MG/50ML
900 INJECTION, SOLUTION INTRAVENOUS ONCE
Status: COMPLETED | OUTPATIENT
Start: 2024-01-13 | End: 2024-01-13

## 2024-01-13 RX ORDER — ONDANSETRON 2 MG/ML
4 INJECTION INTRAMUSCULAR; INTRAVENOUS ONCE AS NEEDED
Status: ACTIVE | OUTPATIENT
Start: 2024-01-13 | End: 2024-01-13

## 2024-01-13 RX ORDER — KETOROLAC TROMETHAMINE 30 MG/ML
30 INJECTION, SOLUTION INTRAMUSCULAR; INTRAVENOUS ONCE
Status: DISCONTINUED | OUTPATIENT
Start: 2024-01-13 | End: 2024-01-16

## 2024-01-13 RX ORDER — CHOLECALCIFEROL (VITAMIN D3) 25 MCG
1 TABLET,CHEWABLE ORAL DAILY
Status: DISCONTINUED | OUTPATIENT
Start: 2024-01-13 | End: 2024-01-16

## 2024-01-13 RX ORDER — ONDANSETRON 2 MG/ML
4 INJECTION INTRAMUSCULAR; INTRAVENOUS EVERY 6 HOURS PRN
Status: DISCONTINUED | OUTPATIENT
Start: 2024-01-13 | End: 2024-01-16

## 2024-01-13 RX ORDER — ACETAMINOPHEN 325 MG/1
650 TABLET ORAL EVERY 6 HOURS PRN
Status: ACTIVE | OUTPATIENT
Start: 2024-01-13 | End: 2024-01-14

## 2024-01-13 RX ORDER — MIDAZOLAM HYDROCHLORIDE 1 MG/ML
INJECTION INTRAMUSCULAR; INTRAVENOUS AS NEEDED
Status: DISCONTINUED | OUTPATIENT
Start: 2024-01-13 | End: 2024-01-13 | Stop reason: SURG

## 2024-01-13 RX ORDER — BISACODYL 10 MG
10 SUPPOSITORY, RECTAL RECTAL ONCE AS NEEDED
Status: DISCONTINUED | OUTPATIENT
Start: 2024-01-13 | End: 2024-01-16

## 2024-01-13 RX ORDER — HYDROCODONE BITARTRATE AND ACETAMINOPHEN 7.5; 325 MG/1; MG/1
1 TABLET ORAL EVERY 6 HOURS PRN
Status: ACTIVE | OUTPATIENT
Start: 2024-01-13 | End: 2024-01-14

## 2024-01-13 RX ORDER — EPHEDRINE SULFATE 50 MG/ML
INJECTION INTRAVENOUS AS NEEDED
Status: DISCONTINUED | OUTPATIENT
Start: 2024-01-13 | End: 2024-01-13 | Stop reason: SURG

## 2024-01-13 RX ORDER — DIPHENHYDRAMINE HCL 25 MG
25 CAPSULE ORAL EVERY 4 HOURS PRN
Status: DISCONTINUED | OUTPATIENT
Start: 2024-01-13 | End: 2024-01-14

## 2024-01-13 RX ORDER — MORPHINE SULFATE 1 MG/ML
INJECTION, SOLUTION EPIDURAL; INTRATHECAL; INTRAVENOUS AS NEEDED
Status: DISCONTINUED | OUTPATIENT
Start: 2024-01-13 | End: 2024-01-13 | Stop reason: SURG

## 2024-01-13 RX ORDER — SODIUM CHLORIDE, SODIUM LACTATE, POTASSIUM CHLORIDE, CALCIUM CHLORIDE 600; 310; 30; 20 MG/100ML; MG/100ML; MG/100ML; MG/100ML
INJECTION, SOLUTION INTRAVENOUS CONTINUOUS
Status: DISCONTINUED | OUTPATIENT
Start: 2024-01-13 | End: 2024-01-16

## 2024-01-13 RX ORDER — GABAPENTIN 300 MG/1
300 CAPSULE ORAL EVERY 8 HOURS PRN
Status: DISCONTINUED | OUTPATIENT
Start: 2024-01-13 | End: 2024-01-16

## 2024-01-13 RX ORDER — LIDOCAINE HYDROCHLORIDE 10 MG/ML
INJECTION, SOLUTION EPIDURAL; INFILTRATION; INTRACAUDAL; PERINEURAL AS NEEDED
Status: DISCONTINUED | OUTPATIENT
Start: 2024-01-13 | End: 2024-01-13 | Stop reason: SURG

## 2024-01-13 RX ORDER — PROCHLORPERAZINE EDISYLATE 5 MG/ML
5 INJECTION INTRAMUSCULAR; INTRAVENOUS ONCE AS NEEDED
Status: ACTIVE | OUTPATIENT
Start: 2024-01-13 | End: 2024-01-13

## 2024-01-13 RX ORDER — ACETAMINOPHEN 500 MG
1000 TABLET ORAL EVERY 6 HOURS
Status: DISCONTINUED | OUTPATIENT
Start: 2024-01-13 | End: 2024-01-16

## 2024-01-13 RX ORDER — FAMOTIDINE 20 MG/1
20 TABLET, FILM COATED ORAL ONCE
Status: COMPLETED | OUTPATIENT
Start: 2024-01-13 | End: 2024-01-13

## 2024-01-13 RX ORDER — METOCLOPRAMIDE HYDROCHLORIDE 5 MG/ML
10 INJECTION INTRAMUSCULAR; INTRAVENOUS ONCE
Status: COMPLETED | OUTPATIENT
Start: 2024-01-13 | End: 2024-01-13

## 2024-01-13 RX ORDER — PHENYLEPHRINE HCL 10 MG/ML
VIAL (ML) INJECTION AS NEEDED
Status: DISCONTINUED | OUTPATIENT
Start: 2024-01-13 | End: 2024-01-13 | Stop reason: SURG

## 2024-01-13 RX ORDER — NALOXONE HYDROCHLORIDE 0.4 MG/ML
0.08 INJECTION, SOLUTION INTRAMUSCULAR; INTRAVENOUS; SUBCUTANEOUS
Status: ACTIVE | OUTPATIENT
Start: 2024-01-13 | End: 2024-01-14

## 2024-01-13 RX ORDER — SODIUM CHLORIDE, SODIUM LACTATE, POTASSIUM CHLORIDE, CALCIUM CHLORIDE 600; 310; 30; 20 MG/100ML; MG/100ML; MG/100ML; MG/100ML
125 INJECTION, SOLUTION INTRAVENOUS CONTINUOUS
Status: DISCONTINUED | OUTPATIENT
Start: 2024-01-13 | End: 2024-01-13

## 2024-01-13 RX ORDER — HYDROMORPHONE HYDROCHLORIDE 1 MG/ML
0.6 INJECTION, SOLUTION INTRAMUSCULAR; INTRAVENOUS; SUBCUTANEOUS
Status: ACTIVE | OUTPATIENT
Start: 2024-01-13 | End: 2024-01-14

## 2024-01-13 RX ORDER — HALOPERIDOL 5 MG/ML
0.5 INJECTION INTRAMUSCULAR ONCE AS NEEDED
Status: ACTIVE | OUTPATIENT
Start: 2024-01-13 | End: 2024-01-13

## 2024-01-13 RX ORDER — CITRIC ACID/SODIUM CITRATE 334-500MG
30 SOLUTION, ORAL ORAL ONCE
Status: COMPLETED | OUTPATIENT
Start: 2024-01-13 | End: 2024-01-13

## 2024-01-13 RX ORDER — DEXAMETHASONE SODIUM PHOSPHATE 4 MG/ML
VIAL (ML) INJECTION AS NEEDED
Status: DISCONTINUED | OUTPATIENT
Start: 2024-01-13 | End: 2024-01-13 | Stop reason: SURG

## 2024-01-13 RX ORDER — HYDROCODONE BITARTRATE AND ACETAMINOPHEN 7.5; 325 MG/1; MG/1
2 TABLET ORAL EVERY 6 HOURS PRN
Status: ACTIVE | OUTPATIENT
Start: 2024-01-13 | End: 2024-01-14

## 2024-01-13 RX ORDER — IBUPROFEN 600 MG/1
600 TABLET ORAL EVERY 6 HOURS
Status: DISCONTINUED | OUTPATIENT
Start: 2024-01-14 | End: 2024-01-16

## 2024-01-13 RX ORDER — ONDANSETRON 2 MG/ML
INJECTION INTRAMUSCULAR; INTRAVENOUS AS NEEDED
Status: DISCONTINUED | OUTPATIENT
Start: 2024-01-13 | End: 2024-01-13 | Stop reason: SURG

## 2024-01-13 RX ORDER — HYDROMORPHONE HYDROCHLORIDE 1 MG/ML
0.4 INJECTION, SOLUTION INTRAMUSCULAR; INTRAVENOUS; SUBCUTANEOUS
Status: ACTIVE | OUTPATIENT
Start: 2024-01-13 | End: 2024-01-14

## 2024-01-13 RX ORDER — NALBUPHINE HYDROCHLORIDE 10 MG/ML
2.5 INJECTION, SOLUTION INTRAMUSCULAR; INTRAVENOUS; SUBCUTANEOUS
Status: DISCONTINUED | OUTPATIENT
Start: 2024-01-13 | End: 2024-01-16

## 2024-01-13 RX ORDER — DOCUSATE SODIUM 100 MG/1
100 CAPSULE, LIQUID FILLED ORAL
Status: DISCONTINUED | OUTPATIENT
Start: 2024-01-13 | End: 2024-01-16

## 2024-01-13 RX ORDER — ONDANSETRON 2 MG/ML
4 INJECTION INTRAMUSCULAR; INTRAVENOUS EVERY 6 HOURS PRN
Status: DISCONTINUED | OUTPATIENT
Start: 2024-01-13 | End: 2024-01-13

## 2024-01-13 RX ORDER — BUPIVACAINE HYDROCHLORIDE 7.5 MG/ML
INJECTION, SOLUTION INTRASPINAL AS NEEDED
Status: DISCONTINUED | OUTPATIENT
Start: 2024-01-13 | End: 2024-01-13 | Stop reason: SURG

## 2024-01-13 RX ORDER — AMMONIA INHALANTS 0.04 G/.3ML
0.3 INHALANT RESPIRATORY (INHALATION) AS NEEDED
Status: DISCONTINUED | OUTPATIENT
Start: 2024-01-13 | End: 2024-01-16

## 2024-01-13 RX ORDER — SIMETHICONE 80 MG
80 TABLET,CHEWABLE ORAL 3 TIMES DAILY PRN
Status: DISCONTINUED | OUTPATIENT
Start: 2024-01-13 | End: 2024-01-16

## 2024-01-13 RX ORDER — MEPERIDINE HYDROCHLORIDE 25 MG/ML
INJECTION INTRAMUSCULAR; INTRAVENOUS; SUBCUTANEOUS AS NEEDED
Status: DISCONTINUED | OUTPATIENT
Start: 2024-01-13 | End: 2024-01-13 | Stop reason: SURG

## 2024-01-13 RX ORDER — FAMOTIDINE 10 MG/ML
20 INJECTION, SOLUTION INTRAVENOUS ONCE
Status: COMPLETED | OUTPATIENT
Start: 2024-01-13 | End: 2024-01-13

## 2024-01-13 RX ORDER — NALBUPHINE HYDROCHLORIDE 10 MG/ML
2.5 INJECTION, SOLUTION INTRAMUSCULAR; INTRAVENOUS; SUBCUTANEOUS EVERY 4 HOURS PRN
Status: ACTIVE | OUTPATIENT
Start: 2024-01-13 | End: 2024-01-14

## 2024-01-13 RX ORDER — ACETAMINOPHEN 500 MG
1000 TABLET ORAL ONCE
Status: COMPLETED | OUTPATIENT
Start: 2024-01-13 | End: 2024-01-13

## 2024-01-13 RX ORDER — METOCLOPRAMIDE 10 MG/1
10 TABLET ORAL ONCE
Status: COMPLETED | OUTPATIENT
Start: 2024-01-13 | End: 2024-01-13

## 2024-01-13 RX ORDER — DIPHENHYDRAMINE HYDROCHLORIDE 50 MG/ML
12.5 INJECTION INTRAMUSCULAR; INTRAVENOUS EVERY 4 HOURS PRN
Status: DISCONTINUED | OUTPATIENT
Start: 2024-01-13 | End: 2024-01-14

## 2024-01-13 RX ADMIN — EPHEDRINE SULFATE 5 MG: 50 INJECTION INTRAVENOUS at 08:26:00

## 2024-01-13 RX ADMIN — SODIUM CHLORIDE, SODIUM LACTATE, POTASSIUM CHLORIDE, CALCIUM CHLORIDE: 600; 310; 30; 20 INJECTION, SOLUTION INTRAVENOUS at 08:33:00

## 2024-01-13 RX ADMIN — PHENYLEPHRINE HCL 100 MCG: 10 MG/ML VIAL (ML) INJECTION at 08:26:00

## 2024-01-13 RX ADMIN — CLINDAMYCIN PHOSPHATE 900 MG: 900 INJECTION, SOLUTION INTRAVENOUS at 08:00:00

## 2024-01-13 RX ADMIN — ONDANSETRON 4 MG: 2 INJECTION INTRAMUSCULAR; INTRAVENOUS at 08:27:00

## 2024-01-13 RX ADMIN — EPHEDRINE SULFATE 5 MG: 50 INJECTION INTRAVENOUS at 08:22:00

## 2024-01-13 RX ADMIN — MORPHINE SULFATE 0.5 MG: 1 INJECTION, SOLUTION EPIDURAL; INTRATHECAL; INTRAVENOUS at 09:03:00

## 2024-01-13 RX ADMIN — MIDAZOLAM HYDROCHLORIDE 2 MG: 1 INJECTION INTRAMUSCULAR; INTRAVENOUS at 08:40:00

## 2024-01-13 RX ADMIN — DEXAMETHASONE SODIUM PHOSPHATE 4 MG: 4 MG/ML VIAL (ML) INJECTION at 07:57:00

## 2024-01-13 RX ADMIN — SODIUM CHLORIDE, SODIUM LACTATE, POTASSIUM CHLORIDE, CALCIUM CHLORIDE: 600; 310; 30; 20 INJECTION, SOLUTION INTRAVENOUS at 07:58:00

## 2024-01-13 RX ADMIN — BUPIVACAINE HYDROCHLORIDE 1.2 ML: 7.5 INJECTION, SOLUTION INTRASPINAL at 07:54:00

## 2024-01-13 RX ADMIN — PHENYLEPHRINE HCL 100 MCG: 10 MG/ML VIAL (ML) INJECTION at 08:20:00

## 2024-01-13 RX ADMIN — MEPERIDINE HYDROCHLORIDE 25 MG: 25 INJECTION INTRAMUSCULAR; INTRAVENOUS; SUBCUTANEOUS at 08:37:00

## 2024-01-13 RX ADMIN — MORPHINE SULFATE 0.3 MG: 1 INJECTION, SOLUTION EPIDURAL; INTRATHECAL; INTRAVENOUS at 07:54:00

## 2024-01-13 RX ADMIN — ONDANSETRON 4 MG: 2 INJECTION INTRAMUSCULAR; INTRAVENOUS at 07:57:00

## 2024-01-13 RX ADMIN — LIDOCAINE HYDROCHLORIDE 3 ML: 10 INJECTION, SOLUTION EPIDURAL; INFILTRATION; INTRACAUDAL; PERINEURAL at 07:50:00

## 2024-01-13 RX ADMIN — PHENYLEPHRINE HCL 200 MCG: 10 MG/ML VIAL (ML) INJECTION at 08:11:00

## 2024-01-13 RX ADMIN — MIDAZOLAM HYDROCHLORIDE 2 MG: 1 INJECTION INTRAMUSCULAR; INTRAVENOUS at 08:29:00

## 2024-01-13 RX ADMIN — PHENYLEPHRINE HCL 100 MCG: 10 MG/ML VIAL (ML) INJECTION at 07:59:00

## 2024-01-13 RX ADMIN — SODIUM CHLORIDE, SODIUM LACTATE, POTASSIUM CHLORIDE, CALCIUM CHLORIDE: 600; 310; 30; 20 INJECTION, SOLUTION INTRAVENOUS at 07:47:00

## 2024-01-13 RX ADMIN — EPHEDRINE SULFATE 5 MG: 50 INJECTION INTRAVENOUS at 08:16:00

## 2024-01-13 RX ADMIN — MORPHINE SULFATE 0.5 MG: 1 INJECTION, SOLUTION EPIDURAL; INTRATHECAL; INTRAVENOUS at 08:44:00

## 2024-01-13 NOTE — PLAN OF CARE
Patient received into room 370 via cart. Beside report received from MARIO ALBERTO Jeronimo. Patient transferred to bed without incident. Bed in locked and low position. Side rails up x 2. VSS. Fundus firm at u/u, lochia small, no clots noted. IV site unremarkable, infusing pitocin at 62.5ml/hr. Pain level 0/10. Baby present at bedside in open crib, ID bands checked and verified. Patient/family oriented to unit, room and call light. Call light within patient reach. Reinforced to patient to call for assistance before getting out of bed to bathroom. Plan of care reviewed. Will continue to monitor per protocol.   Problem: Patient Centered Care  Goal: Patient preferences are identified and integrated in the patient's plan of care  Description: Interventions:  - What would you like us to know as we care for you? Third baby, first girl \"Casi\". Breastfeeding.  - Provide timely, complete, and accurate information to patient/family  - Incorporate patient and family knowledge, values, beliefs, and cultural backgrounds into the planning and delivery of care  - Encourage patient/family to participate in care and decision-making at the level they choose  - Honor patient and family perspectives and choices  Outcome: Progressing     Problem: Patient/Family Goals  Goal: Patient/Family Long Term Goal  Description: Patient's Long Term Goal: Healthy postpartum progression    Interventions:  - Lactation support as needed  - Timely assessments  - Pain control per MAR, discussed with patient  - Bleeding control  - Updated per patient and family preferences   - See additional Care Plan goals for specific interventions  Outcome: Progressing  Goal: Patient/Family Short Term Goal  Description: Patient's Short Term Goal: Ambulate, discontinue Martinez catheter, eat lunch    Interventions:   - See above  - See additional Care Plan goals for specific interventions  Outcome: Progressing     Problem: GASTROINTESTINAL - ADULT  Goal: Minimal or absence of  nausea and vomiting  Description: INTERVENTIONS:  - Maintain adequate hydration with IV or PO as ordered and tolerated  - Nasogastric tube to low intermittent suction as ordered  - Evaluate effectiveness of ordered antiemetic medications  - Provide nonpharmacologic comfort measures as appropriate  - Advance diet as tolerated, if ordered  - Obtain nutritional consult as needed  - Evaluate fluid balance  Outcome: Progressing  Goal: Maintains or returns to baseline bowel function  Description: INTERVENTIONS:  - Assess bowel function  - Maintain adequate hydration with IV or PO as ordered and tolerated  - Evaluate effectiveness of GI medications  - Encourage mobilization and activity  - Obtain nutritional consult as needed  - Establish a toileting routine/schedule  - Consider collaborating with pharmacy to review patient's medication profile  Outcome: Progressing  Goal: Maintains adequate nutritional intake (undernourished)  Description: INTERVENTIONS:  - Monitor percentage of each meal consumed  - Identify factors contributing to decreased intake, treat as appropriate  - Assist with meals as needed  - Monitor I&O, WT and lab values  - Obtain nutritional consult as needed  - Optimize oral hygiene and moisture  - Encourage food from home; allow for food preferences  - Enhance eating environment  Outcome: Progressing  Goal: Achieves appropriate nutritional intake (bariatric)  Description: INTERVENTIONS:  - Monitor for over-consumption  - Identify factors contributing to increased intake, treat as appropriate  - Monitor I&O, WT and lab values  - Obtain nutritional consult as needed  - Evaluate psychosocial factors contributing to over-consumption  Outcome: Progressing     Problem: GENITOURINARY - ADULT  Goal: Absence of urinary retention  Description: INTERVENTIONS:  - Assess patient’s ability to void and empty bladder  - Monitor intake/output and perform bladder scan as needed  - Follow urinary retention protocol/standard  of care  - Consider collaborating with pharmacy to review patient's medication profile  - Implement strategies to promote bladder emptying  Outcome: Progressing     Problem: POSTPARTUM  Goal: Long Term Goal:Experiences normal postpartum course  Description: INTERVENTIONS:  - Assess and monitor vital signs and lab values.  - Assess fundus and lochia.  - Provide ice/sitz baths for perineum discomfort.  - Monitor healing of incision/episiotomy/laceration, and assess for signs and symptoms of infection and hematoma.  - Assess bladder function and monitor for bladder distention.  - Provide/instruct/assist with pericare as needed.  - Provide VTE prophylaxis as needed.  - Monitor bowel function.  - Encourage ambulation and provide assistance as needed.  - Assess and monitor emotional status and provide social service/psych resources as needed.  - Utilize standard precautions and use personal protective equipment as indicated. Ensure aseptic care of all intravenous lines and invasive tubes/drains.  - Obtain immunization and exposure to communicable diseases history.  Outcome: Progressing  Goal: Optimize infant feeding at the breast  Description: INTERVENTIONS:  - Initiate breast feeding within first hour after birth.   - Monitor effectiveness of current breast feeding efforts.  - Assess support systems available to mother/family.  - Identify cultural beliefs/practices regarding lactation, letdown techniques, maternal food preferences.  - Assess mother's knowledge and previous experience with breast feeding.  - Provide information as needed about early infant feeding cues (e.g., rooting, lip smacking, sucking fingers/hand) versus late cue of crying.  - Discuss/demonstrate breast feeding aids (e.g., infant sling, nursing footstool/pillows, and breast pumps).  - Encourage mother/other family members to express feelings/concerns, and actively listen.  - Educate father/SO about benefits of breast feeding and how to manage common  lactation challenges.  - Recommend avoidance of specific medications or substances incompatible with breast feeding.  - Assess and monitor for signs of nipple pain/trauma.  - Instruct and provide assistance with proper latch.  - Review techniques for milk expression (breast pumping) and storage of breast milk. Provide pumping equipment/supplies, instructions and assistance, as needed.  - Encourage rooming-in and breast feeding on demand.  - Encourage skin-to-skin contact.  - Provide LC support as needed.  - Assess for and manage engorgement.  - Provide breast feeding education handouts and information on community breast feeding support.   Outcome: Progressing  Goal: Establishment of adequate milk supply with medication/procedure interruptions  Description: INTERVENTIONS:  - Review techniques for milk expression (breast pumping).   - Provide pumping equipment/supplies, instructions, and assistance until it is safe to breastfeed infant.  Outcome: Progressing  Goal: Appropriate maternal -  bonding  Description: INTERVENTIONS:  - Assess caregiver- interactions.  - Assess caregiver's emotional status and coping mechanisms.  - Encourage caregiver to participate in  daily care.  - Assess support systems available to mother/family.  - Provide /case management support as needed.  Outcome: Progressing

## 2024-01-13 NOTE — PROGRESS NOTES
Pt is a 28 year old female admitted to TR1/TR1-A.     Chief Complaint   Patient presents with    Scheduled       Pt is  39w0d intra-uterine pregnancy.  History obtained, consents signed. Oriented to room, staff, and plan of care.

## 2024-01-13 NOTE — PROGRESS NOTES
Patient transferred to mother/baby room 370 per cart in stable condition with baby and personal belongings.  Accompanied by  and staff.  Report given to mother/baby MARIO ALBERTO Hatch.

## 2024-01-13 NOTE — H&P
[facility]    History & Physical    Brooks Trejo Patient Status:  Surgery Admit - Inpt    1995 MRN [id number]   Location [facility] Attending Andrea Barclay MD   Hosp Day # 0 PCP Lucía Koo PA-C     Date of Admission:  (Not on file)      HPI:   Brooks Trejo is a 28 year old  female, current EGA of 39w0d with an estimated date of delivery of: Estimated Date of Delivery: 24      Brooks Trejo is being admitted for .  Pt signed consent for voluntary permanent sterilization 2 months ago with dr puentes, pt counseled on permanence of bilatral salpingectomy, failure rate of 1 percent or less.   Her current obstetrical history is significant for gestational DM, prior .    Patient reports no complaints and good fetal movement .     Fetal Movement: normal.     History   Obstetric History:   OB History    Para Term  AB Living   4 2 2   1 2   SAB IAB Ectopic Multiple Live Births     1     1      # Outcome Date GA Lbr Carlos/2nd Weight Sex Delivery Anes PTL Lv   4 Current            3 Term 19 39w0d  8 lb 13.5 oz (4.01 kg) M Caesarean Spinal N DARIN      Birth Comments: Time of delivery:8:14 am  Mother's age: 22 years Maternal blood type:O Positive    Hearing Test: Passed Bilateral  CCHD: Passed  TCB 13.00 2019 0401    BILT 10.9 2019 1842      Complications: Group B beta strep +   2 IAB 2017           1 Term 14 42w0d  8 lb 13 oz (3.997 kg) M CS-LTranv Spinal N       Complications: Temp 100.4 or greater, Meconium     Past Medical History:   Past Medical History:   Diagnosis Date    Anemia     Decorative tattoo     Diet controlled gestational diabetes mellitus (GDM) in second trimester 9/15/2023    H/O  section 2019    Thinking about VTOL.  Not good candidate.  Requesting planned repeat c section @39 weeks Delivered at 41-1/2 weeks gestational age by a low transverse   section for arrest of descent, persistent occiput posterior.  Had chorioamnionitis. Baby 8 lb 6 oz  PREOPERATIVE DIAGNOSES: 1. Postterm pregnancy at 41 2/7ths weeks gestation. 2. Arrest of descent. 3. Meconium stained fluid. 4. Chorioamnion    Hepatitis B virus infection     Previous  delivery, antepartum condition or complication 2019    Pt counseled on Vaginal Birth After  () option. Discussed risks of  including uterine rupture with increased risks of  MORTALITY or morbidity which can include hypoxic brain injury. Discussed maternal risks that include hemorrhage. Patient counseled on option of repeat  surgery. Pt counseled on risks of repeat  including infection, bleeding, transfusion and     Previous  section 2019     Past Social History:   Past Surgical History:   Procedure Laterality Date           DELIVERY ONLY      D & C       Family History: No family history on file.  Social History:   Social History     Tobacco Use    Smoking status: Never    Smokeless tobacco: Never   Substance Use Topics    Alcohol use: Not Currently     Comment: occasional; not in pregnancy        Allergies/Medications:   Allergies:   Allergies   Allergen Reactions    Penicillins HIVES     Medications:  No medications prior to admission.       Review of Systems:   As documented in HPI    no complaints and good fetal movement    Physical Exam:        Constitutional: alert, appears stated age, and cooperative  Respiratory: clear to auscultation bilaterally  Cardiac: regular rate and rhythm, S1, S2 normal, no murmur, click, rub or gallop  Abdomen: FHT present  Fetal Surveillance:  pending.   Sterile vaginal exam: deferred      Results:     Lab Results   Component Value Date    TREPONEMALAB Negative 2023    HBVSAG Non-Reactive 10/02/2013    ABO O 2024    RH Positive 2024    WBC 8.4 2024    HGB 12.1 2024    HCT 35.8  2024    .0 2024    CREATSERUM 0.46 (L) 2023    BUN 5 (L) 2023     2023    K 3.6 2023     2023    CO2 20.0 (L) 2023    GLU 98 2023    CA 8.7 2023    ALB 3.8 2023    ALKPHO 117 (H) 2023    BILT 0.3 2023    TP 6.7 2023    AST 11 2023    ALT <7 (L) 2023    TSH 1.710 2019    LIP 94 2019       Lab Results   Component Value Date    DDIMER >4.00 (H) 2019    TROP <0.045 2019    COLORUR Colorless (A) 2023    CLARITY Clear 2023    SPECGRAVITY 1.010 01/10/2024    PROUR Negative 2023    GLUUR Normal 2023    KETUR Trace (A) 2023    BILUR Negative 2023    BLOODURINE Negative 2023    NITRITE Negative 01/10/2024    UROBILINOGEN Normal 2023    LEUUR Negative 2023    UASA 20 (A) 2019       No results found.      Assessment/Plan:    Brooks Trejo is at an estimated gestational age of 39w0d with an estimated date of delivery of:  Estimated Date of Delivery: 24    Not in labor.  Obstetrical history significant for gestational DM, prior .    Admission problem(s):   Pt with prior hx of c section, hx of gest dm, who requested repeat c section at 39 weeks.  Pt was counseled again today on the risks/benefits/alternatives of a repeat   section including the risks of bleeding/infection/damage to internal organs including bowel/bladder/uterus/ovaries/tubes/cervix/vagina/ureters/blood vessels/among others/hemorrhage and blood tranfusions/thromboembolic dz such as dvt/pe/mi/stroke/among other risks/and the pt voiced her understanding and all questions answered.           Risks, benefits, alternatives and possible complications have been discussed in detail with the patient.   Pre-admission, admission, and post admission procedures and expectations were discussed in detail.    All questions answered, all appropriate  consents will be signed at the Hospital. Admission is planned for today.   Continue present management..    Andrea Barclay MD  1/13/2024  4:37 AM

## 2024-01-13 NOTE — ANESTHESIA PREPROCEDURE EVALUATION
Anesthesia PreOp Note    HPI:     Brooks Trejo is a 28 year old female who presents for preoperative consultation requested by: Andrea Barclay MD    Date of Surgery: 2024    Procedure(s):   SECTION WITH BILATERAL SALPINGECTOMY  BILATERAL SALPINGECTOMY    Indication: morelia   repeat        Relevant Problems   No relevant active problems       NPO:  Last Liquid Consumption Date: 24  Last Liquid Consumption Time: 2200  Last Solid Consumption Date: 24  Last Solid Consumption Time: 1300  Last Liquid Consumption Date: 24          History Review:  Patient Active Problem List    Diagnosis Date Noted    Pregnancy 2024    Obesity (BMI 30.0-34.9) 09/15/2023    Diet controlled gestational diabetes mellitus (GDM) in second trimester 09/15/2023    History of 2  sections 2019    Encounter for consultation for female sterilization 2019    Short stature 2019       Past Medical History:   Diagnosis Date    Anemia     Decorative tattoo     Diet controlled gestational diabetes mellitus (GDM) in second trimester 9/15/2023    H/O  section 2019    Thinking about VTOL.  Not good candidate.  Requesting planned repeat c section @39 weeks Delivered at 41-1/2 weeks gestational age by a low transverse  section for arrest of descent, persistent occiput posterior.  Had chorioamnionitis. Baby 8 lb 6 oz  PREOPERATIVE DIAGNOSES: 1. Postterm pregnancy at 41 2/7ths weeks gestation. 2. Arrest of descent. 3. Meconium stained fluid. 4. Chorioamnion    Hepatitis B virus infection     Previous  delivery, antepartum condition or complication 2019    Pt counseled on Vaginal Birth After  () option. Discussed risks of  including uterine rupture with increased risks of  MORTALITY or morbidity which can include hypoxic brain injury. Discussed maternal risks that include hemorrhage. Patient counseled on option of repeat   surgery. Pt counseled on risks of repeat  including infection, bleeding, transfusion and     Previous  section 2019       Past Surgical History:   Procedure Laterality Date           DELIVERY ONLY      D & C         Medications Prior to Admission   Medication Sig Dispense Refill Last Dose    Prenatal Vit-DSS-Fe Fum-FA (PRENATAL 19) Oral Tab Take by mouth.   2024    Misc. Devices (BREAST PUMP) Does not apply Misc DOUBLE ELECTRIC BREAST PUMP EQUIVALENT TO MEDELA PUMP IN STYLE 1 each 0      Current Facility-Administered Medications Ordered in Epic   Medication Dose Route Frequency Provider Last Rate Last Admin    lactated ringers infusion  125 mL/hr Intravenous Continuous Andrea Barclay  mL/hr at 24 0700 125 mL/hr at 24 0700    ondansetron (Zofran) 4 MG/2ML injection 4 mg  4 mg Intravenous Q6H PRN Andrea Barclay MD        sodium citrate-citric acid (Bicitra) 500-334 MG/5ML oral solution 30 mL  30 mL Oral Once Andrea Barclay MD        oxyTOCIN in sodium chloride 0.9% (Pitocin) 30 Units/500mL infusion premix  62.5-900 melissa-units/min Intravenous Continuous Andrea Barclay MD        famotidine (Pepcid) tab 20 mg  20 mg Oral Once Andrea Barclay MD        Or    famotidine (Pepcid) 20 mg/2mL injection 20 mg  20 mg Intravenous Once Andrea Barclay MD        metoclopramide (Reglan) tab 10 mg  10 mg Oral Once Andrea Barclay MD        Or    metoclopramide (Reglan) 5 mg/mL injection 10 mg  10 mg Intravenous Once Andrea Barclay MD        clindamycin phosphate in NaCl 0.9% (Cleocin) 900 mg/50mL IVPB premix 900 mg  900 mg Intravenous Once Andrea Barclay MD         No current [facility]-ordered outpatient medications on file.       Allergies   Allergen Reactions    Penicillins HIVES       Family History   Problem Relation Age of Onset    Diabetes Paternal Grandmother     Diabetes Paternal Grandfather      Social History      Socioeconomic History    Marital status: Single   Tobacco Use    Smoking status: Never    Smokeless tobacco: Never   Vaping Use    Vaping Use: Never used   Substance and Sexual Activity    Alcohol use: Not Currently     Comment: occasional; not in pregnancy    Drug use: Not Currently     Types: Cannabis    Sexual activity: Not Currently       Available pre-op labs reviewed.  Lab Results   Component Value Date    WBC 8.4 01/12/2024    RBC 4.76 01/12/2024    HGB 12.1 01/12/2024    HCT 35.8 01/12/2024    MCV 75.2 (L) 01/12/2024    MCH 25.4 (L) 01/12/2024    MCHC 33.8 01/12/2024    RDW 15.3 (H) 01/12/2024    .0 01/12/2024     Lab Results   Component Value Date     12/17/2023    K 3.6 12/17/2023     12/17/2023    CO2 20.0 (L) 12/17/2023    BUN 5 (L) 12/17/2023    CREATSERUM 0.46 (L) 12/17/2023    GLU 98 12/17/2023    PGLU 86 01/13/2024    CA 8.7 12/17/2023          Vital Signs:  Body mass index is 35.95 kg/m².   height is 1.499 m (4' 11\") and weight is 80.7 kg (178 lb). Her oral temperature is 97.9 °F (36.6 °C). Her blood pressure is 116/64 and her pulse is 104. Her respiration is 20.   Vitals:    01/13/24 0545   BP: 116/64   Pulse: 104   Resp: 20   Temp: 97.9 °F (36.6 °C)   TempSrc: Oral   Weight: 80.7 kg (178 lb)   Height: 1.499 m (4' 11\")        Anesthesia Evaluation     Patient summary reviewed    History of anesthetic complications (Patient had csections x2 with uncomplicated neuraxial anesthesia; reports nausea)   Airway   Mallampati: II  TM distance: >3 FB  Neck ROM: full  Dental - Dentition appears grossly intact     Pulmonary - negative ROS and normal exam   (-) asthma, shortness of breath, recent URI  Cardiovascular   Exercise tolerance: good  (-) hypertension, valvular problems/murmurs, dysrhythmias    Rhythm: regular  Rate: normal    Neuro/Psych - negative ROS   (-) seizures, neuromuscular disease, psychiatric history    GI/Hepatic/Renal    (+) hepatitis B, liver disease  (-) renal  disease    Endo/Other    (+) diabetes mellitus  (-) blood dyscrasia  Abdominal   (+) obese                 Anesthesia Plan:   ASA:  3  Plan:   Spinal  Post-op Pain Management: Intrathecal narcotics  Informed Consent Plan and Risks Discussed With:  Patient  Blood Product Use Consented        I have informed Brooks Trejo and/or legal guardian or family member of the nature of the anesthetic plan, benefits, risks including possible dental damage if relevant, major complications, and any alternative forms of anesthetic management.   All of the patient's questions were answered to the best of my ability. The patient desires the anesthetic management as planned.  Chikis Forman DO  1/13/2024 7:25 AM  Present on Admission:  **None**

## 2024-01-13 NOTE — ANESTHESIA POSTPROCEDURE EVALUATION
Patient: Brooks Trejo    Procedure Summary       Date: 24 Room / Location: [facility] L+D OR  [facility] L+D OR    Anesthesia Start: 747 Anesthesia Stop: 910    Procedures:        SECTION WITH BILATERAL SALPINGECTOMY (Abdomen)      BILATERAL SALPINGECTOMY (Abdomen) Diagnosis: (repeat  section)    Surgeons: Andrea Barclay MD Anesthesiologist: Chikis Forman DO    Anesthesia Type: spinal ASA Status: 3            Anesthesia Type: spinal    Vitals Value Taken Time   /39 24 1045   Temp  24 1054   Pulse 83 24 1045   Resp 16 24 1045   SpO2 98 % 24 1045       [facility] AN Post Evaluation:   Patient Evaluated in PACU  Patient Participation: complete - patient participated  Level of Consciousness: awake and alert  Pain Score: 0  Pain Management: adequate  Airway Patency:patent  Dental exam unchanged from preop  Yes    Cardiovascular Status: hemodynamically stable  Respiratory Status: spontaneous ventilation, unassisted, nonlabored ventilation and room air  Postoperative Hydration stable      Chikis Forman DO  2024 10:54 AM

## 2024-01-13 NOTE — L&D DELIVERY NOTE
[facility]     Section Delivery Note    Brooks Trejo Patient Status:  Inpatient    1995 MRN [id number]   Location [facility] Attending Andrea Barclay MD   Hosp Day # 0 PCP Lucía Koo PA-C     Pre Op Diagnosis: history of prior  section  Post Op Diagnosis: same as pre-op  Procedure:   Surgical/Procedural Cases on this Admission       Case IDs Date Procedure Surgeon Location Status    [id number] 24  SECTION WITH BILATERAL SALPINGECTOMY Andrea Barclay MD [facility] L+D OR [facility]          LTCS and Bilateral Salpingectomy    Surgeon:  Andrea Barclay MD  Assistant Surgeon:  md herb   Anesthesia: spinal  Complications: none  Neonatologist Present: yes  Findings: normal uterus    Delivery     Infant  Date of Delivery: 2024    Time of Delivery: 8:22 AM   Delivery Type: Caesarean Section     Infant Sex/Birthweight: female 8 lb 3.6 oz (3.73 kg)     Presentation Vertex [1]   Position          Apgars:  1 minute: 8                5 minutes: 9                         10 minutes:      Placenta  Date/Time of Delivery: 2024  8:23 AM    Delivery: spontaneous  Placenta to Pathology: no  Cord Gases Submitted: no  Cord Blood Collection: yes  Cord Tissue Collection:no   Cord Complications: none  Sponge and Needle Counts:  Verified: yes  Delivery Comment:  infant pink crying and active in warmer  Dictation Number:  pending.     Input/Output   EBL:  700 ml  Delivery Fluids:  pending  Urine Output: pending ml  Urine Color: clear    Andrea Barclay MD  2024  9:34 AM

## 2024-01-13 NOTE — ANESTHESIA PROCEDURE NOTES
Spinal Block    Date/Time: 1/13/2024 7:50 AM    Performed by: Chikis Forman DO  Authorized by: Chikis Forman DO      General Information and Staff    Start Time:  1/13/2024 7:50 AM  End Time:  1/13/2024 7:54 AM  Anesthesiologist:  Chikis Forman DO  Performed by:  Anesthesiologist  Patient Location:  OR  Site identification: surface landmarks    Reason for Block: surgical anesthesia    Preanesthetic Checklist: patient identified, IV checked, risks and benefits discussed, monitors and equipment checked, pre-op evaluation, timeout performed, anesthesia consent and sterile technique used      Procedure Details    Patient Position:  Sitting  Prep: ChloraPrep and patient draped    Monitoring:  Cardiac monitor and continuous pulse ox  Approach:  Midline  Location:  L3-4  Injection Technique:  Single-shot    Needle    Needle Type:  Pencil-tip  Needle Gauge:  24 G  Needle Length:  4 in    Assessment    Sensory Level:  T6  Events: clear CSF, CSF aspirated, well tolerated and blood negative      Additional Comments

## 2024-01-14 LAB
BASOPHILS # BLD AUTO: 0.02 X10(3) UL (ref 0–0.2)
BASOPHILS NFR BLD AUTO: 0.2 %
DEPRECATED RDW RBC AUTO: 41.6 FL (ref 35.1–46.3)
EOSINOPHIL # BLD AUTO: 0.07 X10(3) UL (ref 0–0.7)
EOSINOPHIL NFR BLD AUTO: 0.6 %
ERYTHROCYTE [DISTWIDTH] IN BLOOD BY AUTOMATED COUNT: 15.3 % (ref 11–15)
HCT VFR BLD AUTO: 33.4 %
HGB BLD-MCNC: 10.5 G/DL
IMM GRANULOCYTES # BLD AUTO: 0.05 X10(3) UL (ref 0–1)
IMM GRANULOCYTES NFR BLD: 0.5 %
LYMPHOCYTES # BLD AUTO: 2.54 X10(3) UL (ref 1–4)
LYMPHOCYTES NFR BLD AUTO: 23.1 %
MCH RBC QN AUTO: 24.1 PG (ref 26–34)
MCHC RBC AUTO-ENTMCNC: 31.4 G/DL (ref 31–37)
MCV RBC AUTO: 76.8 FL
MONOCYTES # BLD AUTO: 0.93 X10(3) UL (ref 0.1–1)
MONOCYTES NFR BLD AUTO: 8.4 %
NEUTROPHILS # BLD AUTO: 7.4 X10 (3) UL (ref 1.5–7.7)
NEUTROPHILS # BLD AUTO: 7.4 X10(3) UL (ref 1.5–7.7)
NEUTROPHILS NFR BLD AUTO: 67.2 %
PLATELET # BLD AUTO: 218 10(3)UL (ref 150–450)
RBC # BLD AUTO: 4.35 X10(6)UL
WBC # BLD AUTO: 11 X10(3) UL (ref 4–11)

## 2024-01-14 NOTE — LACTATION NOTE
01/14/24 1230   Evaluation Type   Evaluation Type Inpatient   Problems identified   Problems identified Knowledge deficit   Maternal history   Maternal history Caesarean section;Gestational diabetes;Anemia   Breastfeeding goal   Breastfeeding goal To maintain breast milk feeding per patient goal   Maternal Assessment   Prior breastfeeding experience (comment below) Multip;Successful   Prior BF experience: comment Breast fed for 4 months (breast and bottles)   Breastfeeding Assistance Breastfeeding assistance provided with permission   Pain assessment   Location/Comment denies   Guidelines for use of:   Other (comment) Mom states that infant is nursing well, seems a bit hungry still after nursing and has started giving bottles of formula this morning, her plan is to do both, discussed the handbook, skin to skin, hand massage/expression and the [place] [facility], encouraged to call if needed.

## 2024-01-14 NOTE — PROGRESS NOTES
POD 1  Pt doing well.  No c/o    Alert and oriented nad  Abd soft nontender fundus firm  incision c/d/I  Ext nt    Ap POD 1  doing well  no c/o.

## 2024-01-14 NOTE — PLAN OF CARE

## 2024-01-14 NOTE — PLAN OF CARE
Problem: GASTROINTESTINAL - ADULT  Goal: Minimal or absence of nausea and vomiting  Description: INTERVENTIONS:  - Maintain adequate hydration with IV or PO as ordered and tolerated  - Nasogastric tube to low intermittent suction as ordered  - Evaluate effectiveness of ordered antiemetic medications  - Provide nonpharmacologic comfort measures as appropriate  - Advance diet as tolerated, if ordered  - Obtain nutritional consult as needed  - Evaluate fluid balance  Outcome: Completed  Goal: Maintains or returns to baseline bowel function  Description: INTERVENTIONS:  - Assess bowel function  - Maintain adequate hydration with IV or PO as ordered and tolerated  - Evaluate effectiveness of GI medications  - Encourage mobilization and activity  - Obtain nutritional consult as needed  - Establish a toileting routine/schedule  - Consider collaborating with pharmacy to review patient's medication profile  Outcome: Completed  Goal: Maintains adequate nutritional intake (undernourished)  Description: INTERVENTIONS:  - Monitor percentage of each meal consumed  - Identify factors contributing to decreased intake, treat as appropriate  - Assist with meals as needed  - Monitor I&O, WT and lab values  - Obtain nutritional consult as needed  - Optimize oral hygiene and moisture  - Encourage food from home; allow for food preferences  - Enhance eating environment  Outcome: Completed  Goal: Achieves appropriate nutritional intake (bariatric)  Description: INTERVENTIONS:  - Monitor for over-consumption  - Identify factors contributing to increased intake, treat as appropriate  - Monitor I&O, WT and lab values  - Obtain nutritional consult as needed  - Evaluate psychosocial factors contributing to over-consumption  Outcome: Completed     Problem: GENITOURINARY - ADULT  Goal: Absence of urinary retention  Description: INTERVENTIONS:  - Assess patient’s ability to void and empty bladder  - Monitor intake/output and perform bladder  scan as needed  - Follow urinary retention protocol/standard of care  - Consider collaborating with pharmacy to review patient's medication profile  - Implement strategies to promote bladder emptying  Outcome: Progressing     Problem: POSTPARTUM  Goal: Long Term Goal:Experiences normal postpartum course  Description: INTERVENTIONS:  - Assess and monitor vital signs and lab values.  - Assess fundus and lochia.  - Provide ice/sitz baths for perineum discomfort.  - Monitor healing of incision/episiotomy/laceration, and assess for signs and symptoms of infection and hematoma.  - Assess bladder function and monitor for bladder distention.  - Provide/instruct/assist with pericare as needed.  - Provide VTE prophylaxis as needed.  - Monitor bowel function.  - Encourage ambulation and provide assistance as needed.  - Assess and monitor emotional status and provide social service/psych resources as needed.  - Utilize standard precautions and use personal protective equipment as indicated. Ensure aseptic care of all intravenous lines and invasive tubes/drains.  - Obtain immunization and exposure to communicable diseases history.  Outcome: Progressing  Goal: Optimize infant feeding at the breast  Description: INTERVENTIONS:  - Initiate breast feeding within first hour after birth.   - Monitor effectiveness of current breast feeding efforts.  - Assess support systems available to mother/family.  - Identify cultural beliefs/practices regarding lactation, letdown techniques, maternal food preferences.  - Assess mother's knowledge and previous experience with breast feeding.  - Provide information as needed about early infant feeding cues (e.g., rooting, lip smacking, sucking fingers/hand) versus late cue of crying.  - Discuss/demonstrate breast feeding aids (e.g., infant sling, nursing footstool/pillows, and breast pumps).  - Encourage mother/other family members to express feelings/concerns, and actively listen.  - Educate  father/SO about benefits of breast feeding and how to manage common lactation challenges.  - Recommend avoidance of specific medications or substances incompatible with breast feeding.  - Assess and monitor for signs of nipple pain/trauma.  - Instruct and provide assistance with proper latch.  - Review techniques for milk expression (breast pumping) and storage of breast milk. Provide pumping equipment/supplies, instructions and assistance, as needed.  - Encourage rooming-in and breast feeding on demand.  - Encourage skin-to-skin contact.  - Provide LC support as needed.  - Assess for and manage engorgement.  - Provide breast feeding education handouts and information on community breast feeding support.   Outcome: Progressing  Goal: Establishment of adequate milk supply with medication/procedure interruptions  Description: INTERVENTIONS:  - Review techniques for milk expression (breast pumping).   - Provide pumping equipment/supplies, instructions, and assistance until it is safe to breastfeed infant.  Outcome: Progressing  Goal: Appropriate maternal -  bonding  Description: INTERVENTIONS:  - Assess caregiver- interactions.  - Assess caregiver's emotional status and coping mechanisms.  - Encourage caregiver to participate in  daily care.  - Assess support systems available to mother/family.  - Provide /case management support as needed.  Outcome: Progressing

## 2024-01-14 NOTE — PROGRESS NOTES
[facility]  Anesthesiology Epidural Follow-up Note  2024    Patient name: Brooks Trejo 28 year old female  : 1995  MRN: [id number]    Diagnosis: [unfilled]    S/P:  csection    Pain treatment modality: Duramorph    Current hospital day: Hospital Day: 2    Pain Scores: 1    Current Medications:  Scheduled Meds:   ketorolac  30 mg Intravenous Once    acetaminophen  1,000 mg Oral Q6H    ibuprofen  600 mg Oral Q6H    docusate sodium  100 mg Oral BID@0600,1800    prenatal vitamin with DHA  1 capsule Oral Daily     Continuous Infusions:   lactated ringers 125 mL/hr at 24 1650     PRN Meds:.nalbuphine, gabapentin, witch hazel-glycerin, phenylephrine-min oil-richie, simethicone, magnesium hydroxide, bisacodyl, ondansetron, ammonia aromatic    Anticoagulation:       Martinez catheter:      Assessment:   No complications from spinal    Plan:   Po meds as needed    ZAHRAA PRESLEY MD  Anesthesia Acute Pain Service [phone]

## 2024-01-15 PROBLEM — O24.410 DIET CONTROLLED GESTATIONAL DIABETES MELLITUS (GDM) IN SECOND TRIMESTER: Chronic | Status: ACTIVE | Noted: 2023-09-15

## 2024-01-15 PROBLEM — O24.410 DIET CONTROLLED GESTATIONAL DIABETES MELLITUS (GDM) IN SECOND TRIMESTER (HCC): Chronic | Status: ACTIVE | Noted: 2023-09-15

## 2024-01-15 NOTE — PLAN OF CARE
Problem: Patient Centered Care  Goal: Patient preferences are identified and integrated in the patient's plan of care  Description: Interventions:  - What would you like us to know as we care for you?   - Provide timely, complete, and accurate information to patient/family  - Incorporate patient and family knowledge, values, beliefs, and cultural backgrounds into the planning and delivery of care  - Encourage patient/family to participate in care and decision-making at the level they choose  - Honor patient and family perspectives and choices  Outcome: Progressing     Problem: Patient/Family Goals  Goal: Patient/Family Long Term Goal  Description: Patient's Long Term Goal:     Interventions:  -   - See additional Care Plan goals for specific interventions  Outcome: Progressing  Goal: Patient/Family Short Term Goal  Description: Patient's Short Term Goal:   Interventions:   -   - See additional Care Plan goals for specific interventions  Outcome: Progressing     Problem: GENITOURINARY - ADULT  Goal: Absence of urinary retention  Description: INTERVENTIONS:  - Assess patient’s ability to void and empty bladder  - Monitor intake/output and perform bladder scan as needed  - Follow urinary retention protocol/standard of care  - Consider collaborating with pharmacy to review patient's medication profile  - Implement strategies to promote bladder emptying  Outcome: Progressing     Problem: POSTPARTUM  Goal: Long Term Goal:Experiences normal postpartum course  Description: INTERVENTIONS:  - Assess and monitor vital signs and lab values.  - Assess fundus and lochia.  - Provide ice/sitz baths for perineum discomfort.  - Monitor healing of incision/episiotomy/laceration, and assess for signs and symptoms of infection and hematoma.  - Assess bladder function and monitor for bladder distention.  - Provide/instruct/assist with pericare as needed.  - Provide VTE prophylaxis as needed.  - Monitor bowel function.  - Encourage  ambulation and provide assistance as needed.  - Assess and monitor emotional status and provide social service/psych resources as needed.  - Utilize standard precautions and use personal protective equipment as indicated. Ensure aseptic care of all intravenous lines and invasive tubes/drains.  - Obtain immunization and exposure to communicable diseases history.  Outcome: Progressing  Goal: Optimize infant feeding at the breast  Description: INTERVENTIONS:  - Initiate breast feeding within first hour after birth.   - Monitor effectiveness of current breast feeding efforts.  - Assess support systems available to mother/family.  - Identify cultural beliefs/practices regarding lactation, letdown techniques, maternal food preferences.  - Assess mother's knowledge and previous experience with breast feeding.  - Provide information as needed about early infant feeding cues (e.g., rooting, lip smacking, sucking fingers/hand) versus late cue of crying.  - Discuss/demonstrate breast feeding aids (e.g., infant sling, nursing footstool/pillows, and breast pumps).  - Encourage mother/other family members to express feelings/concerns, and actively listen.  - Educate father/SO about benefits of breast feeding and how to manage common lactation challenges.  - Recommend avoidance of specific medications or substances incompatible with breast feeding.  - Assess and monitor for signs of nipple pain/trauma.  - Instruct and provide assistance with proper latch.  - Review techniques for milk expression (breast pumping) and storage of breast milk. Provide pumping equipment/supplies, instructions and assistance, as needed.  - Encourage rooming-in and breast feeding on demand.  - Encourage skin-to-skin contact.  - Provide LC support as needed.  - Assess for and manage engorgement.  - Provide breast feeding education handouts and information on community breast feeding support.   Outcome: Progressing  Goal: Establishment of adequate milk  supply with medication/procedure interruptions  Description: INTERVENTIONS:  - Review techniques for milk expression (breast pumping).   - Provide pumping equipment/supplies, instructions, and assistance until it is safe to breastfeed infant.  Outcome: Progressing  Goal: Appropriate maternal -  bonding  Description: INTERVENTIONS:  - Assess caregiver- interactions.  - Assess caregiver's emotional status and coping mechanisms.  - Encourage caregiver to participate in  daily care.  - Assess support systems available to mother/family.  - Provide /case management support as needed.  Outcome: Progressing    VSS, afebrile. Pt with intermittent pain relieved with Tylenol and Motrin. BS active. No BM yet. Voiding freely. Per pt passing gas. Incision with steri strips dry and intact. Fundus is firm, U/U, bleeding is scant. Plan of care reviewed with pt. Questions and concerns answered. Anticipating discharge. Will continue with plan of care.

## 2024-01-16 ENCOUNTER — TELEPHONE (OUTPATIENT)
Dept: OBGYN CLINIC | Facility: CLINIC | Age: 29
End: 2024-01-16

## 2024-01-16 VITALS
SYSTOLIC BLOOD PRESSURE: 123 MMHG | TEMPERATURE: 98 F | BODY MASS INDEX: 35.88 KG/M2 | RESPIRATION RATE: 15 BRPM | HEART RATE: 79 BPM | HEIGHT: 59 IN | WEIGHT: 178 LBS | DIASTOLIC BLOOD PRESSURE: 82 MMHG | OXYGEN SATURATION: 99 %

## 2024-01-16 RX ORDER — ACETAMINOPHEN 500 MG
500 TABLET ORAL EVERY 6 HOURS PRN
Qty: 30 TABLET | Refills: 0 | Status: SHIPPED | OUTPATIENT
Start: 2024-01-16 | End: 2024-01-24

## 2024-01-16 RX ORDER — GABAPENTIN 300 MG/1
300 CAPSULE ORAL EVERY 8 HOURS PRN
Qty: 6 CAPSULE | Refills: 0 | Status: SHIPPED | OUTPATIENT
Start: 2024-01-16 | End: 2024-01-19

## 2024-01-16 RX ORDER — IBUPROFEN 600 MG/1
600 TABLET ORAL EVERY 6 HOURS PRN
Qty: 30 TABLET | Refills: 1 | Status: SHIPPED | OUTPATIENT
Start: 2024-01-16 | End: 2024-02-01

## 2024-01-16 RX ORDER — FERROUS SULFATE 325(65) MG
325 TABLET ORAL EVERY OTHER DAY
Qty: 45 TABLET | Refills: 0 | Status: SHIPPED | OUTPATIENT
Start: 2024-01-16 | End: 2024-04-15

## 2024-01-16 RX ORDER — DOCUSATE SODIUM 100 MG/1
100 CAPSULE, LIQUID FILLED ORAL 2 TIMES DAILY
Qty: 30 CAPSULE | Refills: 0 | Status: SHIPPED | OUTPATIENT
Start: 2024-01-16 | End: 2024-01-31

## 2024-01-16 NOTE — PLAN OF CARE
Problem: PAIN - ADULT  Goal: Verbalizes/displays adequate comfort level or patient's stated pain goal  Description: INTERVENTIONS:  - Encourage pt to monitor pain and request assistance  - Assess pain using appropriate pain scale  - Administer analgesics based on type and severity of pain and evaluate response  - Implement non-pharmacological measures as appropriate and evaluate response  - Consider cultural and social influences on pain and pain management  - Manage/alleviate anxiety  - Utilize distraction and/or relaxation techniques  - Monitor for opioid side effects  - Notify MD/LIP if interventions unsuccessful or patient reports new pain  - Anticipate increased pain with activity and pre-medicate as appropriate  Outcome: Completed     Problem: Patient Centered Care  Goal: Patient preferences are identified and integrated in the patient's plan of care  Description: Interventions:  - What would you like us to know as we care for you?   - Provide timely, complete, and accurate information to patient/family  - Incorporate patient and family knowledge, values, beliefs, and cultural backgrounds into the planning and delivery of care  - Encourage patient/family to participate in care and decision-making at the level they choose  - Honor patient and family perspectives and choices  Outcome: Completed     Problem: Patient/Family Goals  Goal: Patient/Family Long Term Goal  Description: Patient's Long Term Goal:     Interventions:  -   - See additional Care Plan goals for specific interventions  Outcome: Completed  Goal: Patient/Family Short Term Goal  Description: Patient's Short Term Goal:     Interventions:   -   - See additional Care Plan goals for specific interventions  Outcome: Completed     Problem: GENITOURINARY - ADULT  Goal: Absence of urinary retention  Description: INTERVENTIONS:  - Assess patient’s ability to void and empty bladder  - Monitor intake/output and perform bladder scan as needed  - Follow  urinary retention protocol/standard of care  - Consider collaborating with pharmacy to review patient's medication profile  - Implement strategies to promote bladder emptying  Outcome: Completed     Problem: POSTPARTUM  Goal: Long Term Goal:Experiences normal postpartum course  Description: INTERVENTIONS:  - Assess and monitor vital signs and lab values.  - Assess fundus and lochia.  - Provide ice/sitz baths for perineum discomfort.  - Monitor healing of incision/episiotomy/laceration, and assess for signs and symptoms of infection and hematoma.  - Assess bladder function and monitor for bladder distention.  - Provide/instruct/assist with pericare as needed.  - Provide VTE prophylaxis as needed.  - Monitor bowel function.  - Encourage ambulation and provide assistance as needed.  - Assess and monitor emotional status and provide social service/psych resources as needed.  - Utilize standard precautions and use personal protective equipment as indicated. Ensure aseptic care of all intravenous lines and invasive tubes/drains.  - Obtain immunization and exposure to communicable diseases history.  Outcome: Completed  Goal: Optimize infant feeding at the breast  Description: INTERVENTIONS:  - Initiate breast feeding within first hour after birth.   - Monitor effectiveness of current breast feeding efforts.  - Assess support systems available to mother/family.  - Identify cultural beliefs/practices regarding lactation, letdown techniques, maternal food preferences.  - Assess mother's knowledge and previous experience with breast feeding.  - Provide information as needed about early infant feeding cues (e.g., rooting, lip smacking, sucking fingers/hand) versus late cue of crying.  - Discuss/demonstrate breast feeding aids (e.g., infant sling, nursing footstool/pillows, and breast pumps).  - Encourage mother/other family members to express feelings/concerns, and actively listen.  - Educate father/SO about benefits of breast  feeding and how to manage common lactation challenges.  - Recommend avoidance of specific medications or substances incompatible with breast feeding.  - Assess and monitor for signs of nipple pain/trauma.  - Instruct and provide assistance with proper latch.  - Review techniques for milk expression (breast pumping) and storage of breast milk. Provide pumping equipment/supplies, instructions and assistance, as needed.  - Encourage rooming-in and breast feeding on demand.  - Encourage skin-to-skin contact.  - Provide LC support as needed.  - Assess for and manage engorgement.  - Provide breast feeding education handouts and information on community breast feeding support.   Outcome: Completed  Goal: Establishment of adequate milk supply with medication/procedure interruptions  Description: INTERVENTIONS:  - Review techniques for milk expression (breast pumping).   - Provide pumping equipment/supplies, instructions, and assistance until it is safe to breastfeed infant.  Outcome: Completed  Goal: Appropriate maternal -  bonding  Description: INTERVENTIONS:  - Assess caregiver- interactions.  - Assess caregiver's emotional status and coping mechanisms.  - Encourage caregiver to participate in  daily care.  - Assess support systems available to mother/family.  - Provide /case management support as needed.  Outcome: Completed

## 2024-01-16 NOTE — DISCHARGE SUMMARY
[facility]    Post-Partum Caesarean Section Discharge Summary/Note    Brooks Trejo Patient Status:  Inpatient    1995 MRN [id number]   Location [facility] 3SE Attending Andrea Barclay MD   Hosp Day # 3 PCP Lucía Koo PA-C     SUBJECTIVE:    Postpartum Day 3 status post repeat low-transverse  section and bilateral salpingectomy.    The patient feels well. The patient denies emotional concerns such as frequent crying, sadness or anxiety.  Baby is both breast and bottle fed with breast-feeding progressing.  No significant breast tenderness/complaints.  Pain is well controlled with current PO medications.  She is taking p.o. Motrin and Tylenol with infrequent gabapentin use.  Patient is tolerating general diet, passing gas and has had a bowel movement.  Pt is voiding freely, having normal locia and ambulating without difficulty.  Pt denies shortness of breath, difficulty breathing or heart palpitations.    OBJECTIVE:    Vital signs in last 24 hours:  Temp:  [96.8 °F (36 °C)-97.9 °F (36.6 °C)] 97.9 °F (36.6 °C)  Pulse:  [79-86] 79  Resp:  [15-16] 15  BP: (123-130)/(82) 123/82  Input/Output:  No intake or output data in the 24 hours ending 24 0932    General:    alert and cooperative   Abdominal Exam:  soft, mild thony-incisional tenderness with no rebound, guarding or rigidity.  Normal active bowel sounds noted.     Lochia:  appropriate   Uterine Fundus:   firm   Incision:  healing well, no significant drainage, no dehiscence, no significant erythema   DVT Evaluation:  No evidence of DVT seen on physical exam.  No significant lower extremity edema and no calf tenderness     Data Reviewed:       ASSESSMENT/PLAN:    A: 28 y  status post repeat low-transverse  section and bilateral salpingectomy, POD #3. Doing well postoperatively.   Mild postoperative anemia.  Doing well and ready to go home    Plan to discharge home today    Hospital Course:      Date of Admission: 2024      Date of Discharge: 2024  Discharge Time:     Admission Diagnoses: morelia   repeat  Pregnancy  History of prior  section x 2  Multiparity desires permanent sterilization      Discharge Diagnoses: morelia   repeat  Repeat  section and bilateral salpingectomy  Pregnancy    Secondary Diagnosis: Gestational diabetes A1  Maternal obesity    Primary OB Clinician:  [facility]      Summary: 28-year-old -0-1-2 was admitted for planned repeat  section and bilateral salpingectomy due to prior  x 2 and desire for permanent sterilization.  Her surgery was uncomplicated.  Her postoperative course was uncomplicated.  She was discharged home on postoperative day #3 in good condition.    EDC: Estimated Date of Delivery: 24  Gestational Age: 39w0d    Date of Delivery: 2024    Time of Delivery: 8:22 AM   Delivery Type: Caesarean Section   Maternal Anesthesia: spinal   Antepartum complications: 1.  History of  x 2  2.  Multiparity desires permanent sterilization  3.  Gestational diabetes A1  4.  Antepartum anemia    Delivered By: [facility] Andrea Barclay MD    Baby:  Infant Sex:   Information for the patient's :  Alberto Trejo, Girl [[id number]]   female   Infant Birthweight:   Information for the patient's :  Alberto Trejo Girl [[id number]]   8 lb 3.6 oz (3.73 kg)     Apgars: 1 minute: 8                  5 minutes: 9                            10 minutes:        Surgical Procedures       Case IDs Date Procedure Surgeon Location Status    [id number]  SECTION WITH BILATERAL SALPINGECTOMY Andrea Barclay MD EM L+D OR Comp            Intrapartum Complications: None    Feeding Method: both breast and bottle fed     Rh Immune Globulin Given: no    Rubella Vaccine Given: no      Discharge Plan:   Discharge Condition: Good    Discharge medications:  Current  Discharge Medication List        New Orders    Details   gabapentin 300 MG Oral Cap Take 1 capsule (300 mg total) by mouth every 8 (eight) hours as needed (For breakthrough moderate pain).      docusate sodium 100 MG Oral Cap Take 1 capsule (100 mg total) by mouth 2 (two) times daily for 15 days.      ibuprofen 600 MG Oral Tab Take 1 tablet (600 mg total) by mouth every 6 (six) hours as needed for Pain.      Ferrous Sulfate 325 (65 Fe) MG Oral Tab Take 1 tablet (325 mg total) by mouth every other day.      acetaminophen 500 MG Oral Tab Take 1 tablet (500 mg total) by mouth every 6 (six) hours as needed for Pain.           Home Meds - Unchanged    Details   Prenatal Vit-DSS-Fe Fum-FA (PRENATAL 19) Oral Tab Take by mouth.      Misc. Devices (BREAST PUMP) Does not apply Misc DOUBLE ELECTRIC BREAST PUMP EQUIVALENT TO MEDELA PUMP IN STYLE                   Discharge Diet: General diet    Discharge Activity: Minimal activity for two weeks and no driving for 2 weeks.      Follow up:      Follow-up Information       [facility]. Call.    Specialty: Pediatrics  Why: As needed  Contact information:  [address] [facility] [id number] [phone]  Additional information:  Masks are optional for all patients and visitors, unless otherwise indicated.             Andrea Barclay MD Follow up in 3 week(s).    Specialty: OBSTETRICS & GYNECOLOGY  Why: Post Partum Visit  Contact information:  [address]  [address]  [facility] [id number] [phone]                                     MIC PHAN MD, MD  1/16/2024  9:32 AM

## 2024-01-16 NOTE — DISCHARGE INSTRUCTIONS
Call your healthcare provider right away:   -fever over 100.4  -heavy vaginal bleeding   -severe or worsening pain unrelieved by medication   -severe anxiety or depression  - Dizziness and/or changes in vision  - severe headache,   - calf pain or swelling  - Chest pain or shortness of breath.     You are on pelvic rest for 6 weeks. This means nothing in vagina (no intercourse, tampons, etc.) and no baths or swimming pool.    No heavy lifting (no more than the baby) until cleared by OB.     If you had a , monitor your incision daily for any redness, swelling, drainage with foul odor, or warmth to touch.

## 2024-01-16 NOTE — PROGRESS NOTES
Discharge order received from MD.     Discharge instructions and medications reviewed with patient. ID band matched with baby band. Follow up instructions with OB given. Mother verbalizes understanding of instructions and is in stable condition. Patient to be taken down when ride is available.

## 2024-01-16 NOTE — PLAN OF CARE
Problem: PAIN - ADULT  Goal: Verbalizes/displays adequate comfort level or patient's stated pain goal  Description: INTERVENTIONS:  - Encourage pt to monitor pain and request assistance  - Assess pain using appropriate pain scale  - Administer analgesics based on type and severity of pain and evaluate response  - Implement non-pharmacological measures as appropriate and evaluate response  - Consider cultural and social influences on pain and pain management  - Manage/alleviate anxiety  - Utilize distraction and/or relaxation techniques  - Monitor for opioid side effects  - Notify MD/LIP if interventions unsuccessful or patient reports new pain  - Anticipate increased pain with activity and pre-medicate as appropriate  Outcome: Progressing     Problem: GENITOURINARY - ADULT  Goal: Absence of urinary retention  Description: INTERVENTIONS:  - Assess patient’s ability to void and empty bladder  - Monitor intake/output and perform bladder scan as needed  - Follow urinary retention protocol/standard of care  - Consider collaborating with pharmacy to review patient's medication profile  - Implement strategies to promote bladder emptying  Outcome: Progressing     Problem: POSTPARTUM  Goal: Long Term Goal:Experiences normal postpartum course  Description: INTERVENTIONS:  - Assess and monitor vital signs and lab values.  - Assess fundus and lochia.  - Provide ice/sitz baths for perineum discomfort.  - Monitor healing of incision/episiotomy/laceration, and assess for signs and symptoms of infection and hematoma.  - Assess bladder function and monitor for bladder distention.  - Provide/instruct/assist with pericare as needed.  - Provide VTE prophylaxis as needed.  - Monitor bowel function.  - Encourage ambulation and provide assistance as needed.  - Assess and monitor emotional status and provide social service/psych resources as needed.  - Utilize standard precautions and use personal protective equipment as indicated.  Ensure aseptic care of all intravenous lines and invasive tubes/drains.  - Obtain immunization and exposure to communicable diseases history.  Outcome: Progressing  Goal: Optimize infant feeding at the breast  Description: INTERVENTIONS:  - Initiate breast feeding within first hour after birth.   - Monitor effectiveness of current breast feeding efforts.  - Assess support systems available to mother/family.  - Identify cultural beliefs/practices regarding lactation, letdown techniques, maternal food preferences.  - Assess mother's knowledge and previous experience with breast feeding.  - Provide information as needed about early infant feeding cues (e.g., rooting, lip smacking, sucking fingers/hand) versus late cue of crying.  - Discuss/demonstrate breast feeding aids (e.g., infant sling, nursing footstool/pillows, and breast pumps).  - Encourage mother/other family members to express feelings/concerns, and actively listen.  - Educate father/SO about benefits of breast feeding and how to manage common lactation challenges.  - Recommend avoidance of specific medications or substances incompatible with breast feeding.  - Assess and monitor for signs of nipple pain/trauma.  - Instruct and provide assistance with proper latch.  - Review techniques for milk expression (breast pumping) and storage of breast milk. Provide pumping equipment/supplies, instructions and assistance, as needed.  - Encourage rooming-in and breast feeding on demand.  - Encourage skin-to-skin contact.  - Provide LC support as needed.  - Assess for and manage engorgement.  - Provide breast feeding education handouts and information on community breast feeding support.   Outcome: Progressing  Goal: Establishment of adequate milk supply with medication/procedure interruptions  Description: INTERVENTIONS:  - Review techniques for milk expression (breast pumping).   - Provide pumping equipment/supplies, instructions, and assistance until it is safe to  breastfeed infant.  Outcome: Progressing  Goal: Appropriate maternal -  bonding  Description: INTERVENTIONS:  - Assess caregiver- interactions.  - Assess caregiver's emotional status and coping mechanisms.  - Encourage caregiver to participate in  daily care.  - Assess support systems available to mother/family.  - Provide /case management support as needed.  Outcome: Progressing

## 2024-02-13 ENCOUNTER — POSTPARTUM (OUTPATIENT)
Dept: OBGYN CLINIC | Facility: CLINIC | Age: 29
End: 2024-02-13
Payer: MEDICAID

## 2024-02-13 VITALS — HEART RATE: 108 BPM | SYSTOLIC BLOOD PRESSURE: 116 MMHG | DIASTOLIC BLOOD PRESSURE: 72 MMHG

## 2024-02-13 NOTE — PROGRESS NOTES
HPI:   Brooks Trejo is a 28 year old female who presents for a pp visit.  Pt feels well.  No c/o.       Wt Readings from Last 6 Encounters:   24 178 lb (80.7 kg)   01/10/24 178 lb 3.2 oz (80.8 kg)   01/10/24 176 lb (79.8 kg)   23 175 lb 9.6 oz (79.7 kg)   23 176 lb 6.4 oz (80 kg)   23 177 lb (80.3 kg)     There is no height or weight on file to calculate BMI.     AST (U/L)   Date Value   2023 11   2019 7 (L)   2019 7 (L)     ALT (U/L)   Date Value   2023 <7 (L)   2019 12 (L)   2019 15        Current Outpatient Medications   Medication Sig Dispense Refill    Ferrous Sulfate 325 (65 Fe) MG Oral Tab Take 1 tablet (325 mg total) by mouth every other day. 45 tablet 0    Misc. Devices (BREAST PUMP) Does not apply Misc DOUBLE ELECTRIC BREAST PUMP EQUIVALENT TO MEDELA PUMP IN STYLE 1 each 0    Prenatal Vit-DSS-Fe Fum-FA (PRENATAL 19) Oral Tab Take by mouth.        Past Medical History:   Diagnosis Date    Anemia     Decorative tattoo     Diet controlled gestational diabetes mellitus (GDM) in second trimester 9/15/2023    H/O  section 2019    Thinking about VTOL.  Not good candidate.  Requesting planned repeat c section @39 weeks Delivered at 41-1/2 weeks gestational age by a low transverse  section for arrest of descent, persistent occiput posterior.  Had chorioamnionitis. Baby 8 lb 6 oz  PREOPERATIVE DIAGNOSES: 1. Postterm pregnancy at 41 2/7ths weeks gestation. 2. Arrest of descent. 3. Meconium stained fluid. 4. Chorioamnion    Hepatitis B virus infection     Previous  delivery, antepartum condition or complication 2019    Pt counseled on Vaginal Birth After  () option. Discussed risks of  including uterine rupture with increased risks of  MORTALITY or morbidity which can include hypoxic brain injury. Discussed maternal risks that include hemorrhage. Patient counseled on option of repeat   surgery. Pt counseled on risks of repeat  including infection, bleeding, transfusion and     Previous  section 2019      Past Surgical History:   Procedure Laterality Date           DELIVERY ONLY      D & C        Family History   Problem Relation Age of Onset    Diabetes Paternal Grandmother     Diabetes Paternal Grandfather       Social History:   Social History     Socioeconomic History    Marital status: Single   Tobacco Use    Smoking status: Never    Smokeless tobacco: Never   Vaping Use    Vaping Use: Never used   Substance and Sexual Activity    Alcohol use: Not Currently     Comment: occasional; not in pregnancy    Drug use: Not Currently     Types: Cannabis    Sexual activity: Not Currently     Social Determinants of Health     Financial Resource Strain: Low Risk  (2024)    Financial Resource Strain     Difficulty of Paying Living Expenses: Not hard at all     Med Affordability: No   Food Insecurity: No Food Insecurity (2024)    Food Insecurity     Food Insecurity: Never true   Transportation Needs: No Transportation Needs (2024)    Transportation Needs     Lack of Transportation: No   Stress: No Stress Concern Present (2024)    Stress     Feeling of Stress : No   Housing Stability: Low Risk  (2024)    Housing Stability     Housing Instability: No            REVIEW OF SYSTEMS:   GENERAL: feels well otherwise  SKIN: denies any unusual skin lesions  EYES:denies blurred vision or double vision  HEENT: denies nasal congestion, sinus pain or ST  LUNGS: denies shortness of breath with exertion  CARDIOVASCULAR: denies chest pain on exertion  GI: denies abdominal pain,denies heartburn  : denies dysuria, vaginal discharge or itching,periods regular   MUSCULOSKELETAL: denies back pain  NEURO: denies headaches  PSYCHE: denies depression or anxiety  HEMATOLOGIC: denies hx of anemia  ENDOCRINE: denies thyroid history  ALL/ASTHMA: denies hx of allergy or  asthma    EXAM:   /72   Pulse 108   LMP 04/20/2023 (Approximate)   Breastfeeding Yes   There is no height or weight on file to calculate BMI.   GENERAL: well developed, well nourished,in no apparent distress  SKIN: no rashes,no suspicious lesions  HEENT: atraumatic, normocephalic  EYES:normal in appearance  NECK: supple,no adenopathy  CHEST: no chest tenderness  BREAST: def pt  LUNGS: clear to auscultation  CARDIO: RRR without murmur  GI: good BS's,no masses, HSM or tenderness  :def pt  MUSCULOSKELETAL: back is not tender,FROM of the back  EXTREMITIES: no cyanosis, clubbing or edema  NEURO: Oriented times three      ASSESSMENT AND PLAN:   Brooks Trejo is a 28 year old female who presents for a.pp visit.  Pt feels well.  No c/o.    Self breast exam explained. Health maintenance. There is no height or weight on file to calculate BMI., recommended low fat diet and aerobic exercise 30 minutes three times weekly.  The patient indicates understanding of these issues and agrees to the plan.  The patient is asked to return for an annual visit.

## 2024-03-05 ENCOUNTER — TELEPHONE (OUTPATIENT)
Dept: OBGYN UNIT | Facility: HOSPITAL | Age: 29
End: 2024-03-05

## 2024-12-07 ENCOUNTER — HOSPITAL ENCOUNTER (OUTPATIENT)
Age: 29
Discharge: HOME | End: 2024-12-07
Payer: MEDICAID

## 2024-12-07 VITALS
DIASTOLIC BLOOD PRESSURE: 75 MMHG | SYSTOLIC BLOOD PRESSURE: 122 MMHG | TEMPERATURE: 98 F | HEART RATE: 98 BPM | OXYGEN SATURATION: 99 % | RESPIRATION RATE: 20 BRPM

## 2024-12-07 DIAGNOSIS — T30.0 BURN: Primary | ICD-10-CM

## 2024-12-07 PROCEDURE — 99204 OFFICE O/P NEW MOD 45 MIN: CPT

## 2024-12-07 PROCEDURE — 99213 OFFICE O/P EST LOW 20 MIN: CPT

## 2024-12-07 RX ORDER — SILVER SULFADIAZINE 10 MG/G
CREAM TOPICAL ONCE
Status: COMPLETED | OUTPATIENT
Start: 2024-12-07 | End: 2024-12-07
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
## 2024-12-07 NOTE — DISCHARGE INSTRUCTIONS
Gently wash the area twice daily with soap and water.  Dry thoroughly after.  Apply the Silvadene cream twice daily.  Monitor for signs of infection.  Follow-up with your doctor for wound check.  Return for any concerns.

## (undated) DEVICE — POWDER HEMSTAT 3GM OXIDIZED REGENERATED CELOS

## (undated) DEVICE — PROXIMATE SKIN STAPLERS (35 WIDE) CONTAINS 35 STAINLESS STEEL STAPLES (FIXED HEAD): Brand: PROXIMATE

## (undated) DEVICE — ENSEAL 20 CM SHAFT, LARGE JAW: Brand: ENSEAL X1

## (undated) NOTE — LETTER
VACCINE ADMINISTRATION RECORD  PARENT / GUARDIAN APPROVAL  Date: 10/27/2023  Vaccine administered to: Rufina Soulier     : 1995    MRN: [id number]    A copy of the appropriate Centers for Disease Control and Prevention Vaccine Information statement has been provided. I have read or have had explained the information about the diseases and the vaccines listed below. There was an opportunity to ask questions and any questions were answered satisfactorily. I believe that I understand the benefits and risks of the vaccine cited and ask that the vaccine(s) listed below be given to me or to the person named above (for whom I am authorized to make this request). VACCINES ADMINISTERED:  Tdap    I have read and hereby agree to be bound by the terms of this agreement as stated above. My signature is valid until revoked by me in writing. This document is signed by Rufina Soulier, relationship: Self on 10/27/2023.:                                                                                                                                         Parent / Lei Jessica Bynum served as a witness to authentication that the identity of the person signing electronically is in fact the person represented as signing.

## (undated) NOTE — ED AVS SNAPSHOT
Paulette Barahona   MRN: [id number]    Department:  [facility] Emergency Department   Date of Visit:  6/18/2019           Disclosure     Insurance plans vary and the physician(s) referred by the ER may not be covered by your plan.  Please co CARE PHYSICIAN AT ONCE OR RETURN IMMEDIATELY TO THE EMERGENCY DEPARTMENT. If you have been prescribed any medication(s), please fill your prescription right away and begin taking the medication(s) as directed.   If you believe that any of the medications

## (undated) NOTE — ED AVS SNAPSHOT
Wang Moura   MRN: [id number]    Department:  [facility] Emergency Department   Date of Visit:  4/23/2019           Disclosure     Insurance plans vary and the physician(s) referred by the ER may not be covered by your plan.  Please co CARE PHYSICIAN AT ONCE OR RETURN IMMEDIATELY TO THE EMERGENCY DEPARTMENT. If you have been prescribed any medication(s), please fill your prescription right away and begin taking the medication(s) as directed.   If you believe that any of the medications

## (undated) NOTE — LETTER
6/25/2019              [address]         Dear Kamila Hitchcock records indicate that the test ordered for you, Echocardiogram, by Deny Garvin MD  has not been done.   If you have,

## (undated) NOTE — ED AVS SNAPSHOT
Jayna Porter   MRN: [id number]    Department:  [facility] Emergency Department   Date of Visit:  3/29/2019           Disclosure     Insurance plans vary and the physician(s) referred by the ER may not be covered by your plan.  Please co CARE PHYSICIAN AT ONCE OR RETURN IMMEDIATELY TO THE EMERGENCY DEPARTMENT. If you have been prescribed any medication(s), please fill your prescription right away and begin taking the medication(s) as directed.   If you believe that any of the medications

## (undated) NOTE — LETTER
Dear New Mom,    We hope you are doing well. If, for any reason, you have questions or concerns about your health or your baby’s health, please contact your provider or your pediatrician or family medicine physician regarding your baby.     At [facility], we feel that postpartum support is very important for new families. Please see the enclosed new parent support flyer that lists support programs and resources with both in-person and online options.     Additionally, our Breastfeeding Centers at [facility] and [facility] in [place], offer outpatient visits with our International Board-Certified Lactation   Consultants (IBCLCs) for any breastfeeding concerns or questions you may have.    For issues related to stress, anxiety or depression, we have a Nurturing Mom support group that meets both in-person or online.  There’s also a 24-hour Mom’s Line where you can request a phone call from a clinical therapist for assistance for postpartum depression.    We encourage you to take advantage of these programs and resources as you recover from childbirth and learn to care for your new infant.    Best wishes,    [facility]            [id number]

## (undated) NOTE — LETTER
AUTHORIZATION FOR SURGICAL OPERATION OR OTHER PROCEDURE    1.  I hereby authorize Dr. Benjamin Knee and [facility] staff assigned to my case to perform the following operation and/or procedure at the [facility]:    ________________________________ Time:  ________ A. M.  P.M.        Patient Name:  ______________________________________________________  (please print)      Patient signature:  ___________________________________________________             Relationship to Patient:

## (undated) NOTE — MR AVS SNAPSHOT
After Visit Summary   11/14/2019    Kristen Coe    MRN: [id number]           Visit Information     Date & Time  11/14/2019 10:20 AM Provider  Michelle Peters MD [address] [place] [address]  Dept.  Phone  [phone] 11/22/2019 10:20 AM Joyce Elizalde       Follow-up Instructions    Return in about 1 year (around 11/14/2020). [facility] now offers Video Visits through [address] for adult and pediatric patients.   Vi Conditions needing urgent attention, but are not life-threatening. Average cost  $120*       EMERGENCY ROOM         Life-threatening emergencies needing immediate intervention   at a hospital emergency room.       Average cost  $2,300*   *Cost varie

## (undated) NOTE — LETTER
AUTHORIZATION FOR SURGICAL OPERATION OR OTHER PROCEDURE    1.  I hereby authorize Dr. Carlos Danielle, and [facility] staff assigned to my case to perform the following operation and/or procedure at the [facility]:      [facility] IUD INSERTION Relationship to Patient:           []  Parent    Responsible person                          []  Spouse  In case of minor or                    [] Other  _____________   Incompetent name:  __________________________________________________

## (undated) NOTE — LETTER
[facility]  [address]  [place] [address]  [phone]    Blood Transfusion Consent    In the course of your treatment, it may become necessary to administer a transfusion of blood or blood components. This form provides basic information concerning this procedure and, if signed by you, authorizes its administration. By signing this form, you agree that all of your questions about the administration of blood or blood products have been answered by the ordering medical professional or designee.    Description of Procedure  Blood is introduced into one of your veins, commonly in the arm, using a sterilized disposable needle. The amount of blood transfused, and whether the transfusion will be of blood or blood components is a judgement the physician will make based on your particular needs.    Risks  The transfusion is a common procedure of low risk.  MINOR AND TEMPORARY REACTIONS ARE NOT UNCOMMON, including a slight bruise, swelling or local reaction in the area where the needle pierces your skin, or a nonserious reaction to the transfused material itself, including headache, fever or mild skin reaction, such as rash.  Serious reactions are possible, though very unlikely, and include severe allergic reaction (shock) and destruction (hemolysis) of transfused blood cells.  Infectious diseases which are known to be transmitted by blood transfusion include certain types of viral Hepatitis(liver infection from a virus), Human Immunodeficiency Virus (HIV-1,2) infection, a viral infection known to cause Acquired Immunodeficiency Syndrome (AIDS), as well as certain other bacterial, viral, and parasitic diseases. While a minimal risk of acquiring an infectious disease from transfused blood exists, in accordance with the Federal and State law, all due care has been taken in donor selection and testing to avoid transmission of disease.    Alternatives  If loss of blood poses serious threats  during your treatment, THERE IS NO EFFECTIVE ALTERNATIVE TO BLOOD TRANSFUSION. However, if you have any further questions on this matter, your provider will fully explain the alternatives to you if it has not already been done.    I, ______________________________, have read/had read to me the above. I understand the matters bearing on the decision whether or not to authorize a transfusion of blood or blood components. I have no questions which have not been answered to my full satisfaction. I hereby consent to such transfusion as my physician may deem necessary or advisable in the course of my treatment.    ______________________________________________                    ___________________________  (Signature of Patient or Responsible party in case of minor,                 (Printed Name of Patient or incompetent, or unconscious patient)              Responsible Party)    ___________________________               _____________________  (Relationship to Patient if not self)                                    (Date and Time)    __________________________                                                           ______________________              (Signature of Witness)               (Printed Name of Witness)     Language line ()    Telephone/Verbal/Video Consent    __________________________                     ____________________  (Signature of 2nd Witness           (Printed Name of 2nd  Telephone/Verbal/Video Consent)           Witness)    Patient Name: Brooks Trejo     : 1995                 Printed: 2024     Medical Record #: [id number]      Rev: 2023

## (undated) NOTE — LETTER
7/30/2019              [address]        [address] [facility] [address]         To Whom It May Concern:      [address] Melba Wilkerson is currently under our care for pregnancy.  It is permissible at her gestational a